# Patient Record
Sex: MALE | Race: WHITE | NOT HISPANIC OR LATINO | Employment: OTHER | URBAN - METROPOLITAN AREA
[De-identification: names, ages, dates, MRNs, and addresses within clinical notes are randomized per-mention and may not be internally consistent; named-entity substitution may affect disease eponyms.]

---

## 2017-05-18 ENCOUNTER — APPOINTMENT (OUTPATIENT)
Dept: NEUROLOGY | Facility: MEDICAL CENTER | Age: 72
End: 2017-05-18
Payer: MEDICARE

## 2019-04-29 ENCOUNTER — OFFICE VISIT (OUTPATIENT)
Dept: CARDIOLOGY | Facility: MEDICAL CENTER | Age: 74
End: 2019-04-29
Payer: MEDICARE

## 2019-04-29 ENCOUNTER — TELEPHONE (OUTPATIENT)
Dept: CARDIOLOGY | Facility: MEDICAL CENTER | Age: 74
End: 2019-04-29

## 2019-04-29 VITALS
HEIGHT: 75 IN | HEART RATE: 70 BPM | BODY MASS INDEX: 25.12 KG/M2 | OXYGEN SATURATION: 96 % | DIASTOLIC BLOOD PRESSURE: 70 MMHG | SYSTOLIC BLOOD PRESSURE: 120 MMHG | WEIGHT: 202 LBS

## 2019-04-29 DIAGNOSIS — I50.22 SYSTOLIC CHF, CHRONIC (HCC): ICD-10-CM

## 2019-04-29 DIAGNOSIS — I44.2 COMPLETE HEART BLOCK (HCC): ICD-10-CM

## 2019-04-29 DIAGNOSIS — I48.91 ATRIAL FIBRILLATION, UNSPECIFIED TYPE (HCC): ICD-10-CM

## 2019-04-29 DIAGNOSIS — Z95.0 CARDIAC PACEMAKER IN SITU: ICD-10-CM

## 2019-04-29 DIAGNOSIS — G20.A1 PARKINSON DISEASE: ICD-10-CM

## 2019-04-29 DIAGNOSIS — I10 BENIGN ESSENTIAL HTN: ICD-10-CM

## 2019-04-29 DIAGNOSIS — I48.91 ATRIAL FIBRILLATION, UNSPECIFIED TYPE (HCC): Primary | ICD-10-CM

## 2019-04-29 DIAGNOSIS — I47.10 SVT (SUPRAVENTRICULAR TACHYCARDIA): ICD-10-CM

## 2019-04-29 DIAGNOSIS — I25.10 CORONARY ARTERY DISEASE INVOLVING NATIVE CORONARY ARTERY OF NATIVE HEART WITHOUT ANGINA PECTORIS: ICD-10-CM

## 2019-04-29 LAB — EKG IMPRESSION: NORMAL

## 2019-04-29 PROCEDURE — 99205 OFFICE O/P NEW HI 60 MIN: CPT | Mod: 25 | Performed by: INTERNAL MEDICINE

## 2019-04-29 PROCEDURE — 93279 PRGRMG DEV EVAL PM/LDLS PM: CPT | Performed by: INTERNAL MEDICINE

## 2019-04-29 PROCEDURE — 93000 ELECTROCARDIOGRAM COMPLETE: CPT | Mod: 59 | Performed by: INTERNAL MEDICINE

## 2019-04-29 RX ORDER — PRAVASTATIN SODIUM 40 MG
TABLET ORAL
COMMUNITY
End: 2019-05-28

## 2019-04-29 RX ORDER — PRAVASTATIN SODIUM 10 MG
TABLET ORAL
COMMUNITY
End: 2019-05-28

## 2019-04-29 NOTE — PROGRESS NOTES
Arrhythmia Clinic Note (New patient)     DOS: 4/29/2019    Referring physician: Shaan Bryson    Chief complaint/Reason for consult: consideration for WATCHMAN    HPI:  Patient is a 74 yo M. 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 He has a history of systolic CHF 2/2 ICM, NYHA II symptoms that are stable. He has a history of CAD. S/p PCI. Last one was > 3 years ago. He has a history of SVT s/p prior ablation. Subsequently with PAF that is now persistent and largely asymptomatic. He also has Parkinsons with associated gait instability and at higher fall risk. They are understandably hesitant regarding long term oral anticoagulation. He was referred for consideration of DIDIER closure.    ROS (+ highlighted in red):  Constitutional: Fevers/chills/fatigue/weightloss  HEENT: Blurry vision/eye pain/sore throat/hearing loss  Respiratory: Shortness of breath/cough  Cardiovascular: Chest pain/palpitations/edema/orthopnea/syncope  GI: Nausea/vomitting/diarrhea  MSK: Arthralgias/myagias/muscle weakness  Skin: Rash/sores  Neurological: Numbness/tremors/vertigo  Endocrine: Excessive thirst/polyuria/cold intolerance/heat intolerance  Psych: Depression/anxiety    Past Medical History:   Diagnosis Date   • CAD (coronary artery disease) 10/11    s/p FRANCI to diagonal   • Chronic back pain    • ED (erectile dysfunction)    • GOUT     questionable   • History of atrial fibrillation     s/p ablation   • HTN (hypertension)     Borderline   • Hyperlipidemia    • Mitral regurgitation     moderate echo 1/13   • MAGDALENA (obstructive sleep apnea)     [facility]   • Pacemaker    • SVT (supraventricular tachycardia) ([facility])     s/p ablation       Past Surgical History:   Procedure Laterality Date   • RECOVERY  11/2/2011    Performed by SURGERY, CATH-RECOVERY at SURGERY SAME DAY [facility] ORS   • OTHER  2011    heart cath and stent   • KNEE ARTHROSCOPY  [id number]   • OTHER ORTHOPEDIC SURGERY      3 right knee  "surgies   • PACEMAKER INSERTION         Social History     Social History   • Marital status:      Spouse name: N/A   • Number of children: N/A   • Years of education: N/A     Occupational History   • Not on file.     Social History Main Topics   • Smoking status: Former Smoker     Packs/day: 1.00     Years: 10.00     Types: Cigarettes     Quit date: 11/2/1973   • Smokeless tobacco: Never Used   • Alcohol use Yes      Comment: social   • Drug use: No   • Sexual activity: Not on file     Other Topics Concern   • Not on file     Social History Narrative   • No narrative on file       Family History   Problem Relation Age of Onset   • Lung Disease Mother    • Stroke Father        No Known Allergies    Current Outpatient Prescriptions   Medication Sig Dispense Refill   • coenzyme Q-10 30 MG capsule Take 60 mg by mouth every day.     • citalopram (CELEXA) 40 MG Tab TAKE ONE TABLET BY MOUTH ONE TIME DAILY  90 Tab 0   • meloxicam (MOBIC) 15 MG tablet TAKE ONE TABLET BY MOUTH ONCE DAILY 90 Tab 3   • aspirin 81 MG tablet Take 81 mg by mouth every day.     • metoprolol SR (TOPROL XL) 50 MG TABLET SR 24 HR Take 50 mg by mouth every day.     • carbidopa-levodopa (SINEMET)  MG Tab Take 1 Tab by mouth 3 times a day. 270 Tab 1   • clopidogrel (PLAVIX) 75 MG Tab Take 75 mg by mouth every day.     • atorvastatin (LIPITOR) 40 MG TABS Take 1 Tab by mouth every day. 30 Tab 11   • pravastatin (PRAVACHOL) 10 MG Tab Take  by mouth.     • pravastatin (PRAVACHOL) 40 MG tablet Take  by mouth.     • aspirin EC (ECOTRIN) 81 MG Tablet Delayed Response Take  by mouth.     • aspirin EC (ECOTRIN) 81 MG Tablet Delayed Response Take  by mouth.       No current facility-administered medications for this visit.        Physical Exam:  Vitals:    04/29/19 0916   BP: 120/70   BP Location: Left arm   Patient Position: Sitting   BP Cuff Size: Adult   Pulse: 70   SpO2: 96%   Weight: 91.6 kg (202 lb)   Height: 1.905 m (6' 3\")     General " appearance: NAD, conversant   Eyes: anicteric sclerae, moist conjunctivae; no lid-lag; PERRLA  HENT: Atraumatic; oropharynx clear with moist mucous membranes and no mucosal ulcerations; normal hard and soft palate  Neck: Trachea midline; FROM, supple, no thyromegaly or lymphadenopathy  Lungs: CTA, with normal respiratory effort and no intercostal retractions  CV: RRR, no MRGs, no JVD   Abdomen: Soft, non-tender; no masses or HSM  Extremities: No peripheral edema or extremity lymphadenopathy  Skin: Normal temperature, turgor and texture; no rash, ulcers or subcutaneous nodules  Psych: Appropriate affect, alert and oriented to person, place and time    Data:  Labs reviewed    Prior echo/stress results reviewed:  LVEF 45%    EKG interpreted by me:  AF v-paced    Impression/Plan:  1. Atrial fibrillation, unspecified type (HCC)  EKG   2. Systolic CHF, chronic (HCC)     3. SVT (supraventricular tachycardia) ([facility])     4. Coronary artery disease involving native coronary artery of native heart without angina pectoris     5. Cardiac pacemaker in situ     6. Complete heart block (HCC)     7. Parkinson disease (HCC)       -Patient with CHADSVasc 5 and wary regarding long term OAC due to his Parkinsons and higher fall risk  -I think DIDIER closure is not unreasonable  -Risks/benefits/alternatives and details of the procedure were discussed  -All his questions were answered and they would like to proceed  -I will obtain images from his prior ALBARO at [facility]  -I will have him hold plavix prior to procedure and will keep him on ASA/warfarin regimen for 45 days post  -He is also chronically RV paced with mildly depressed LV function, I would consider CS lead upgrade for him at time of generator change, but I will defer this to his primary EP    Bharath Swenson MD

## 2019-05-28 ENCOUNTER — ANESTHESIA EVENT (OUTPATIENT)
Dept: SURGERY | Facility: MEDICAL CENTER | Age: 74
DRG: 274 | End: 2019-05-28
Payer: MEDICARE

## 2019-05-28 ENCOUNTER — APPOINTMENT (OUTPATIENT)
Dept: ADMISSIONS | Facility: MEDICAL CENTER | Age: 74
DRG: 274 | End: 2019-05-28
Attending: INTERNAL MEDICINE
Payer: MEDICARE

## 2019-05-28 RX ORDER — MELOXICAM 15 MG/1
15 TABLET ORAL PRN
Status: ON HOLD | COMMUNITY
End: 2019-05-30

## 2019-05-29 ENCOUNTER — HOSPITAL ENCOUNTER (INPATIENT)
Facility: MEDICAL CENTER | Age: 74
LOS: 1 days | DRG: 274 | End: 2019-05-30
Attending: INTERNAL MEDICINE | Admitting: INTERNAL MEDICINE
Payer: MEDICARE

## 2019-05-29 ENCOUNTER — APPOINTMENT (OUTPATIENT)
Dept: CARDIOLOGY | Facility: MEDICAL CENTER | Age: 74
DRG: 274 | End: 2019-05-29
Attending: INTERNAL MEDICINE
Payer: MEDICARE

## 2019-05-29 ENCOUNTER — ANESTHESIA (OUTPATIENT)
Dept: SURGERY | Facility: MEDICAL CENTER | Age: 74
DRG: 274 | End: 2019-05-29
Payer: MEDICARE

## 2019-05-29 ENCOUNTER — APPOINTMENT (OUTPATIENT)
Dept: RADIOLOGY | Facility: MEDICAL CENTER | Age: 74
DRG: 274 | End: 2019-05-29
Attending: INTERNAL MEDICINE
Payer: MEDICARE

## 2019-05-29 DIAGNOSIS — I48.91 ATRIAL FIBRILLATION, UNSPECIFIED TYPE (HCC): ICD-10-CM

## 2019-05-29 LAB
ABO + RH BLD: NORMAL
ABO GROUP BLD: NORMAL
ACT BLD: 257 SEC (ref 74–137)
ANION GAP SERPL CALC-SCNC: 8 MMOL/L (ref 0–11.9)
BASOPHILS # BLD AUTO: 0.8 % (ref 0–1.8)
BASOPHILS # BLD: 0.06 K/UL (ref 0–0.12)
BLD GP AB SCN SERPL QL: NORMAL
BUN SERPL-MCNC: 26 MG/DL (ref 8–22)
CALCIUM SERPL-MCNC: 9.4 MG/DL (ref 8.5–10.5)
CHLORIDE SERPL-SCNC: 105 MMOL/L (ref 96–112)
CO2 SERPL-SCNC: 26 MMOL/L (ref 20–33)
CREAT SERPL-MCNC: 1.31 MG/DL (ref 0.5–1.4)
EKG IMPRESSION: NORMAL
EOSINOPHIL # BLD AUTO: 0.12 K/UL (ref 0–0.51)
EOSINOPHIL NFR BLD: 1.6 % (ref 0–6.9)
ERYTHROCYTE [DISTWIDTH] IN BLOOD BY AUTOMATED COUNT: 42.4 FL (ref 35.9–50)
GLUCOSE SERPL-MCNC: 97 MG/DL (ref 65–99)
HCT VFR BLD AUTO: 48.7 % (ref 42–52)
HGB BLD-MCNC: 16 G/DL (ref 14–18)
IMM GRANULOCYTES # BLD AUTO: 0.04 K/UL (ref 0–0.11)
IMM GRANULOCYTES NFR BLD AUTO: 0.5 % (ref 0–0.9)
INR PPP: 1.07 (ref 0.87–1.13)
INR PPP: 1.22 (ref 0.87–1.13)
LYMPHOCYTES # BLD AUTO: 1.34 K/UL (ref 1–4.8)
LYMPHOCYTES NFR BLD: 17.4 % (ref 22–41)
MCH RBC QN AUTO: 31.2 PG (ref 27–33)
MCHC RBC AUTO-ENTMCNC: 32.9 G/DL (ref 33.7–35.3)
MCV RBC AUTO: 94.9 FL (ref 81.4–97.8)
MONOCYTES # BLD AUTO: 0.81 K/UL (ref 0–0.85)
MONOCYTES NFR BLD AUTO: 10.5 % (ref 0–13.4)
NEUTROPHILS # BLD AUTO: 5.31 K/UL (ref 1.82–7.42)
NEUTROPHILS NFR BLD: 69.2 % (ref 44–72)
NRBC # BLD AUTO: 0 K/UL
NRBC BLD-RTO: 0 /100 WBC
PLATELET # BLD AUTO: 216 K/UL (ref 164–446)
PMV BLD AUTO: 9.4 FL (ref 9–12.9)
POTASSIUM SERPL-SCNC: 4.4 MMOL/L (ref 3.6–5.5)
PROTHROMBIN TIME: 14 SEC (ref 12–14.6)
PROTHROMBIN TIME: 15.5 SEC (ref 12–14.6)
RBC # BLD AUTO: 5.13 M/UL (ref 4.7–6.1)
RH BLD: NORMAL
SODIUM SERPL-SCNC: 139 MMOL/L (ref 135–145)
WBC # BLD AUTO: 7.7 K/UL (ref 4.8–10.8)

## 2019-05-29 PROCEDURE — A9270 NON-COVERED ITEM OR SERVICE: HCPCS | Performed by: INTERNAL MEDICINE

## 2019-05-29 PROCEDURE — 85610 PROTHROMBIN TIME: CPT | Mod: 91

## 2019-05-29 PROCEDURE — 33340 PERQ CLSR TCAT L ATR APNDGE: CPT | Mod: Q0 | Performed by: INTERNAL MEDICINE

## 2019-05-29 PROCEDURE — 71046 X-RAY EXAM CHEST 2 VIEWS: CPT

## 2019-05-29 PROCEDURE — 700105 HCHG RX REV CODE 258: Performed by: ANESTHESIOLOGY

## 2019-05-29 PROCEDURE — 80048 BASIC METABOLIC PNL TOTAL CA: CPT

## 2019-05-29 PROCEDURE — 700111 HCHG RX REV CODE 636 W/ 250 OVERRIDE (IP)

## 2019-05-29 PROCEDURE — 86901 BLOOD TYPING SEROLOGIC RH(D): CPT

## 2019-05-29 PROCEDURE — 700102 HCHG RX REV CODE 250 W/ 637 OVERRIDE(OP): Performed by: INTERNAL MEDICINE

## 2019-05-29 PROCEDURE — 93010 ELECTROCARDIOGRAM REPORT: CPT | Performed by: INTERNAL MEDICINE

## 2019-05-29 PROCEDURE — 85025 COMPLETE CBC W/AUTO DIFF WBC: CPT

## 2019-05-29 PROCEDURE — 86900 BLOOD TYPING SEROLOGIC ABO: CPT

## 2019-05-29 PROCEDURE — 85347 COAGULATION TIME ACTIVATED: CPT

## 2019-05-29 PROCEDURE — B246ZZ4 ULTRASONOGRAPHY OF RIGHT AND LEFT HEART, TRANSESOPHAGEAL: ICD-10-PCS | Performed by: INTERNAL MEDICINE

## 2019-05-29 PROCEDURE — 700105 HCHG RX REV CODE 258: Performed by: INTERNAL MEDICINE

## 2019-05-29 PROCEDURE — 305387 CL-LEFT ATRIAL APPENDAGE CLOSURE

## 2019-05-29 PROCEDURE — 02L73DK OCCLUSION OF LEFT ATRIAL APPENDAGE WITH INTRALUMINAL DEVICE, PERCUTANEOUS APPROACH: ICD-10-PCS | Performed by: INTERNAL MEDICINE

## 2019-05-29 PROCEDURE — 93005 ELECTROCARDIOGRAM TRACING: CPT | Performed by: INTERNAL MEDICINE

## 2019-05-29 PROCEDURE — 160002 HCHG RECOVERY MINUTES (STAT)

## 2019-05-29 PROCEDURE — 770020 HCHG ROOM/CARE - TELE (206)

## 2019-05-29 PROCEDURE — 700101 HCHG RX REV CODE 250: Performed by: ANESTHESIOLOGY

## 2019-05-29 PROCEDURE — 700111 HCHG RX REV CODE 636 W/ 250 OVERRIDE (IP): Performed by: ANESTHESIOLOGY

## 2019-05-29 PROCEDURE — 93325 DOPPLER ECHO COLOR FLOW MAPG: CPT

## 2019-05-29 PROCEDURE — 86850 RBC ANTIBODY SCREEN: CPT

## 2019-05-29 RX ORDER — METOPROLOL SUCCINATE 50 MG/1
50 TABLET, EXTENDED RELEASE ORAL
Status: DISCONTINUED | OUTPATIENT
Start: 2019-05-29 | End: 2019-05-30 | Stop reason: HOSPADM

## 2019-05-29 RX ORDER — OXYCODONE HCL 5 MG/5 ML
5 SOLUTION, ORAL ORAL
Status: DISCONTINUED | OUTPATIENT
Start: 2019-05-29 | End: 2019-05-29 | Stop reason: HOSPADM

## 2019-05-29 RX ORDER — CITALOPRAM 40 MG/1
40 TABLET ORAL DAILY
Status: DISCONTINUED | OUTPATIENT
Start: 2019-05-30 | End: 2019-05-30 | Stop reason: HOSPADM

## 2019-05-29 RX ORDER — WARFARIN SODIUM 5 MG/1
5 TABLET ORAL
Status: DISCONTINUED | OUTPATIENT
Start: 2019-05-29 | End: 2019-05-30

## 2019-05-29 RX ORDER — OXYCODONE HCL 5 MG/5 ML
10 SOLUTION, ORAL ORAL
Status: DISCONTINUED | OUTPATIENT
Start: 2019-05-29 | End: 2019-05-29 | Stop reason: HOSPADM

## 2019-05-29 RX ORDER — PROTAMINE SULFATE 10 MG/ML
INJECTION, SOLUTION INTRAVENOUS
Status: COMPLETED
Start: 2019-05-29 | End: 2019-05-29

## 2019-05-29 RX ORDER — CEFAZOLIN SODIUM 1 G/3ML
INJECTION, POWDER, FOR SOLUTION INTRAMUSCULAR; INTRAVENOUS PRN
Status: DISCONTINUED | OUTPATIENT
Start: 2019-05-29 | End: 2019-05-29 | Stop reason: SURG

## 2019-05-29 RX ORDER — SODIUM CHLORIDE, SODIUM LACTATE, POTASSIUM CHLORIDE, CALCIUM CHLORIDE 600; 310; 30; 20 MG/100ML; MG/100ML; MG/100ML; MG/100ML
INJECTION, SOLUTION INTRAVENOUS CONTINUOUS
Status: DISCONTINUED | OUTPATIENT
Start: 2019-05-29 | End: 2019-05-29

## 2019-05-29 RX ORDER — ATORVASTATIN CALCIUM 40 MG/1
40 TABLET, FILM COATED ORAL
Status: DISCONTINUED | OUTPATIENT
Start: 2019-05-29 | End: 2019-05-30 | Stop reason: HOSPADM

## 2019-05-29 RX ORDER — HEPARIN SODIUM,PORCINE 1000/ML
VIAL (ML) INJECTION
Status: COMPLETED
Start: 2019-05-29 | End: 2019-05-29

## 2019-05-29 RX ORDER — SODIUM CHLORIDE, SODIUM LACTATE, POTASSIUM CHLORIDE, CALCIUM CHLORIDE 600; 310; 30; 20 MG/100ML; MG/100ML; MG/100ML; MG/100ML
INJECTION, SOLUTION INTRAVENOUS
Status: DISCONTINUED | OUTPATIENT
Start: 2019-05-29 | End: 2019-05-29 | Stop reason: SURG

## 2019-05-29 RX ORDER — ONDANSETRON 2 MG/ML
4 INJECTION INTRAMUSCULAR; INTRAVENOUS
Status: DISCONTINUED | OUTPATIENT
Start: 2019-05-29 | End: 2019-05-29 | Stop reason: HOSPADM

## 2019-05-29 RX ORDER — ONDANSETRON 2 MG/ML
INJECTION INTRAMUSCULAR; INTRAVENOUS PRN
Status: DISCONTINUED | OUTPATIENT
Start: 2019-05-29 | End: 2019-05-29 | Stop reason: SURG

## 2019-05-29 RX ORDER — SODIUM CHLORIDE 9 MG/ML
INJECTION, SOLUTION INTRAVENOUS CONTINUOUS
Status: DISCONTINUED | OUTPATIENT
Start: 2019-05-29 | End: 2019-05-30 | Stop reason: HOSPADM

## 2019-05-29 RX ORDER — SODIUM CHLORIDE 9 MG/ML
INJECTION, SOLUTION INTRAVENOUS CONTINUOUS
Status: DISCONTINUED | OUTPATIENT
Start: 2019-05-29 | End: 2019-05-29

## 2019-05-29 RX ORDER — LIDOCAINE HYDROCHLORIDE 40 MG/ML
SOLUTION TOPICAL
Status: DISPENSED
Start: 2019-05-29 | End: 2019-05-30

## 2019-05-29 RX ORDER — SODIUM CHLORIDE, SODIUM LACTATE, POTASSIUM CHLORIDE, CALCIUM CHLORIDE 600; 310; 30; 20 MG/100ML; MG/100ML; MG/100ML; MG/100ML
INJECTION, SOLUTION INTRAVENOUS CONTINUOUS
Status: ACTIVE | OUTPATIENT
Start: 2019-05-29 | End: 2019-05-30

## 2019-05-29 RX ADMIN — SODIUM CHLORIDE, POTASSIUM CHLORIDE, SODIUM LACTATE AND CALCIUM CHLORIDE: 600; 310; 30; 20 INJECTION, SOLUTION INTRAVENOUS at 13:37

## 2019-05-29 RX ADMIN — CEFAZOLIN 2 G: 330 INJECTION, POWDER, FOR SOLUTION INTRAMUSCULAR; INTRAVENOUS at 13:51

## 2019-05-29 RX ADMIN — Medication 60 MG: at 22:23

## 2019-05-29 RX ADMIN — SODIUM CHLORIDE: 9 INJECTION, SOLUTION INTRAVENOUS at 19:28

## 2019-05-29 RX ADMIN — CARBIDOPA AND LEVODOPA 1 TABLET: 25; 100 TABLET ORAL at 21:41

## 2019-05-29 RX ADMIN — PROTAMINE SULFATE 40 MG: 10 INJECTION, SOLUTION INTRAVENOUS at 14:41

## 2019-05-29 RX ADMIN — ROCURONIUM BROMIDE 50 MG: 10 INJECTION, SOLUTION INTRAVENOUS at 13:44

## 2019-05-29 RX ADMIN — HEPARIN SODIUM 4000 UNITS: 1000 INJECTION, SOLUTION INTRAVENOUS; SUBCUTANEOUS at 14:00

## 2019-05-29 RX ADMIN — METOPROLOL SUCCINATE 50 MG: 50 TABLET, EXTENDED RELEASE ORAL at 19:31

## 2019-05-29 RX ADMIN — ATORVASTATIN CALCIUM 40 MG: 40 TABLET, FILM COATED ORAL at 19:29

## 2019-05-29 RX ADMIN — ONDANSETRON 4 MG: 2 INJECTION INTRAMUSCULAR; INTRAVENOUS at 14:34

## 2019-05-29 RX ADMIN — SODIUM CHLORIDE, POTASSIUM CHLORIDE, SODIUM LACTATE AND CALCIUM CHLORIDE: 600; 310; 30; 20 INJECTION, SOLUTION INTRAVENOUS at 12:14

## 2019-05-29 RX ADMIN — HEPARIN SODIUM: 1000 INJECTION, SOLUTION INTRAVENOUS; SUBCUTANEOUS at 14:01

## 2019-05-29 RX ADMIN — WARFARIN SODIUM 5 MG: 5 TABLET ORAL at 21:41

## 2019-05-29 RX ADMIN — PROPOFOL 200 MG: 10 INJECTION, EMULSION INTRAVENOUS at 13:44

## 2019-05-29 RX ADMIN — EPHEDRINE SULFATE 10 MG: 50 INJECTION INTRAMUSCULAR; INTRAVENOUS; SUBCUTANEOUS at 14:15

## 2019-05-29 ASSESSMENT — PATIENT HEALTH QUESTIONNAIRE - PHQ9
SUM OF ALL RESPONSES TO PHQ9 QUESTIONS 1 AND 2: 0
2. FEELING DOWN, DEPRESSED, IRRITABLE, OR HOPELESS: NOT AT ALL
SUM OF ALL RESPONSES TO PHQ9 QUESTIONS 1 AND 2: 0
1. LITTLE INTEREST OR PLEASURE IN DOING THINGS: NOT AT ALL
SUM OF ALL RESPONSES TO PHQ9 QUESTIONS 1 AND 2: 0
1. LITTLE INTEREST OR PLEASURE IN DOING THINGS: NOT AT ALL
1. LITTLE INTEREST OR PLEASURE IN DOING THINGS: NOT AT ALL
2. FEELING DOWN, DEPRESSED, IRRITABLE, OR HOPELESS: NOT AT ALL

## 2019-05-29 ASSESSMENT — COPD QUESTIONNAIRES
IN THE PAST 12 MONTHS DO YOU DO LESS THAN YOU USED TO BECAUSE OF YOUR BREATHING PROBLEMS: DISAGREE/UNSURE
DO YOU EVER COUGH UP ANY MUCUS OR PHLEGM?: NO/ONLY WITH OCCASIONAL COLDS OR INFECTIONS
HAVE YOU SMOKED AT LEAST 100 CIGARETTES IN YOUR ENTIRE LIFE: YES
COPD SCREENING SCORE: 4
DURING THE PAST 4 WEEKS HOW MUCH DID YOU FEEL SHORT OF BREATH: NONE/LITTLE OF THE TIME

## 2019-05-29 ASSESSMENT — COGNITIVE AND FUNCTIONAL STATUS - GENERAL
SUGGESTED CMS G CODE MODIFIER MOBILITY: CH
MOBILITY SCORE: 24

## 2019-05-29 ASSESSMENT — CHA2DS2 SCORE
CHA2DS2 VASC SCORE: 4
VASCULAR DISEASE: YES
DIABETES: NO
AGE 75 OR GREATER: YES
PRIOR STROKE OR TIA OR THROMBOEMBOLISM: NO
AGE 65 TO 74: NO
SEX: MALE
CHF OR LEFT VENTRICULAR DYSFUNCTION: YES
HYPERTENSION: NO

## 2019-05-29 ASSESSMENT — LIFESTYLE VARIABLES: ALCOHOL_USE: NO

## 2019-05-29 ASSESSMENT — PAIN SCALES - GENERAL: PAIN_LEVEL: 0

## 2019-05-29 NOTE — ANESTHESIA PREPROCEDURE EVALUATION
Relevant Problems   (+) Aortic regurgitation   (+) CAD (coronary artery disease)   (+) Mitral regurgitation   (+) Parkinson disease (HCC)     Vitals:    05/29/19 1212   BP: 133/96   Pulse: 79   Resp: 18   Temp: 36.5 °C (97.7 °F)   SpO2: 97%      Physical Exam    Airway   Mallampati: II  TM distance: >3 FB  Neck ROM: full       Cardiovascular - normal exam  Rhythm: regular  Rate: normal  (-) murmur     Dental - normal exam         Pulmonary - normal exam  Breath sounds clear to auscultation     Abdominal    Neurological - normal exam                 Anesthesia Plan    ASA 3 (afib, hx svt)       Plan - general       Airway plan will be ETT        Induction: intravenous    Postoperative Plan: Postoperative administration of opioids is intended.    Pertinent diagnostic labs and testing reviewed    Informed Consent:    Anesthetic plan and risks discussed with patient.    Use of blood products discussed with: patient whom consented to blood products.

## 2019-05-29 NOTE — OR NURSING
1503 patient arrived from cath lab s/p watchman DIDIER closure. Patient awake. Denies pain, denies n/v, right groin access site, gauze tegaderm for dressing clean dry intact and soft. Vital signs stable. Arterial line right radial.  1540 Arterial line removed, gauze and tegaderm applied no bleeding, no hematoma.   1600 patient given water tolerating well.    1715 Criteria met to discharge patient out of ppu.  1725 Report given to Catie GRESHAM T838 all questions answered.  1735 patient transported to room with all his personal belongings.  1740 checked surgical sites with MARGA Haddad no bleeding no hematoma. Patient not in any distress, tele monitor on. Care transferred to Catie GRESHAM

## 2019-05-29 NOTE — OP REPORT
"[facility] Electrophysiology/Structural Heart Procedure Note     Procedure(s) Performed:   1) Watchman DIDIER closure    Indication(s):  Paroxysmal atrial fibrillation  CAD  Chronic systolic CHF    Physician(s): Bharath Swenson M.D.     Resident/Assistant(s): None     Anesthesia: General endotracheal anesthetic with Dr. Noah Bacon     Specimen(s) Removed: None     Estimated Blood Loss:  30cc     Complications:  None     Description of Procedure:   After informed written consent, the patient was brought to the cath lab in the fasting, non-sedated state. The patient was prepped and draped in the usual sterile fashion. Femoral venous access was obtained using the modified Seldinger technique. This was done under direct US guidance to visualize the needle insertion. Images were saved to the PACS system. In the right femoral vein, an 8 Fr sheath were inserted over 0.035” guidewire and exchanged for 16 Fr outer sheath. An 8.5 Fr [place] trans-septal sheath was used for trans-septal access. ALBARO was used to identify the atrial septum, and left atrial appendage.  A [place] trans-septal needle was inserted to into the trans-septal sheath, and under ultrasound guidance a inferior/posterior trans-septal location was used to cross into the left atrium. Intravenous heparin was given to target an -300. A stiff exchange length 0.035\" wire was inserted into the left atrium/left pulmonary veins, over which we exchanged to the WATCHMAN delivery sheath. The WATCHMAN device was prepped and flushed. A pigtail catheter was advanced into the delivery sheath into the left atrium and then after counter clockwise torque maneuvered into the body of the left atrial appendage. Cine was taken to verify the location of the pigtail in the appendage and to assess for depth. The sheath was then advanced over the pigtail until sufficient depth was reached.  The pigtail was removed, and under wet to wet connection the WATCHMAN device was advanced " through the delivery sheath until markers were aligned. The sheath was retracted slowly to deploy the device. The initial deployment the device was not seated correctly and tilted posteriorly. We performed a full recapture. We re-advanced the pigtail in the appendage and maneuvered the WATCHMAN sheath back in the appendage. A second device was re-prepped and again device was advanced through delivery sheath until markers aligned. Sheath was retracted and device deployed again. After the device was deployed we assessed again for leak with contrast injected through the sheath as well as a tug test. We verified good position, anchoring, size, and seal with no/minimal leak with ALBARO. After all criteria were met we detached the device from the delivery system. At the end of the procedure, heparin was reversed with protamine, the catheter and sheaths were removed, and hemostasis was achieved by manual compression along with a figure of 8 silk suture. Following recovery from anesthesia, the patient was transferred to the PPU in good condition.        Fluoroscopy time:  8.1 minutes    Contrast used: 74 cc     Device implanted:  Size  27 mm  Serial #[id number]  Max appendage pre-measurement 23mm  Max device measurement 22 mm (19%) compression     Impressions:    1. Successful DIDIER closure      Recommendations:  1. Warfarin and ASA 81 (target INR 2-2.5)   2. Admit to monitored bedrest.  3. Echo and removal of figure of 8 suture in the AM

## 2019-05-29 NOTE — H&P
EP Pre-procedure History and Physical    DOS: 5/29/2019    Chief complaint/Reason for consult: AF    Interval History:  Patient is a 72 yo M. History of AF and Parkinson's disease with high fall risk. Desire to get off OAC. Referred for DIDIER closure. Here for procedure today.    ROS (+ highlighted in red):  Constitutional: Fevers/chills/fatigue/weightloss  HEENT: Blurry vision/eye pain/sore throat/hearing loss  Respiratory: Shortness of breath/cough  Cardiovascular: Chest pain/palpitations/edema/orthopnea/syncope  GI: Nausea/vomitting/diarrhea  MSK: Arthralgias/myagias/muscle weakness  Skin: Rash/sores  Neurological: Numbness/tremors/vertigo  Endocrine: Excessive thirst/polyuria/cold intolerance/heat intolerance  Psych: Depression/anxiety    Past Medical History:   Diagnosis Date   • Anxiety    • Arthritis     osteo, right knee   • Breath shortness     with exertion   • CAD (coronary artery disease) 10/11    s/p FRANCI to diagonal   • Cancer (HCC)     skin   • Cataract     removed bilat   • Chronic cough    • Depression    • ED (erectile dysfunction)    • GOUT     questionable   • Heart burn    • High cholesterol    • History of atrial fibrillation     s/p ablation   • History of claustrophobia    • HTN (hypertension)     Borderline   • Hyperlipidemia    • Indigestion    • Mitral regurgitation     moderate echo 1/13   • MAGDALENA (obstructive sleep apnea)     doesn't uses CPAP   • Pacemaker    • Parkinson disease (HCC)    • Snoring    • SVT (supraventricular tachycardia) ([facility])     s/p ablation   • Urinary urgency        Past Surgical History:   Procedure Laterality Date   • RECOVERY  11/2/2011    Performed by SURGERY, CATH-RECOVERY at SURGERY SAME DAY [facility] ORS   • OTHER  2011    heart cath and stent   • CATARACT PHACO WITH IOL Bilateral    • KNEE ARTHROSCOPY  [id number]   • OTHER ORTHOPEDIC SURGERY      3 right knee surgies   • PACEMAKER INSERTION         Social History     Social History   • Marital status:       "Spouse name: N/A   • Number of children: N/A   • Years of education: N/A     Occupational History   • Not on file.     Social History Main Topics   • Smoking status: Former Smoker     Packs/day: 1.00     Years: 10.00     Types: Cigarettes     Quit date: 11/2/1973   • Smokeless tobacco: Never Used   • Alcohol use Yes      Comment: 1-2 daily   • Drug use: No   • Sexual activity: Not on file     Other Topics Concern   • Not on file     Social History Narrative   • No narrative on file       Family History   Problem Relation Age of Onset   • Lung Disease Mother    • Stroke Father        No Known Allergies    Current Facility-Administered Medications   Medication Dose Route Frequency Provider Last Rate Last Dose   • lidocaine (XYLOCAINE) 1 % injection 0.5 mL  0.5 mL Intradermal Once PRN Bharath Swenson M.D.       • lactated ringers infusion   Intravenous Continuous Shining NEGRO Swenson 10 mL/hr at 05/29/19 1214         Physical Exam:  Vitals:    05/29/19 1134 05/29/19 1212   BP:  133/96   Pulse:  79   Resp:  18   Temp:  36.5 °C (97.7 °F)   TempSrc:  Temporal   SpO2:  97%   Weight: 91.6 kg (202 lb)    Height: 1.905 m (6' 3\")      General appearance: NAD, conversant   Eyes: anicteric sclerae, moist conjunctivae; no lid-lag; PERRLA  HENT: Atraumatic; oropharynx clear with moist mucous membranes and no mucosal ulcerations; normal hard and soft palate  Neck: Trachea midline; FROM, supple, no thyromegaly or lymphadenopathy  Lungs: CTA, with normal respiratory effort and no intercostal retractions  CV: irregularly irregular, no MRGs, no JVD  Abdomen: Soft, non-tender; no masses or HSM  Extremities: No peripheral edema or extremity lymphadenopathy  Skin: Normal temperature, turgor and texture; no rash, ulcers or subcutaneous nodules  Psych: Appropriate affect, alert and oriented to person, place and time    Data:  Labs reviewed    EKG interpreted by me:  AF    Impression/Plan:  1)AF  2)Oral anticoagulation  3)Parkinson's " disease    -Risks/benefits/alternatives discussed  -All questions answered  -Proceed with ALBARO/WATCHMAN    Bharath Swenson MD

## 2019-05-29 NOTE — ANESTHESIA PROCEDURE NOTES
Arterial Line  Performed by: SRINIVAS FELIPE  Authorized by: SRINIVAS FELIPE     Start Time:  5/29/2019 1:49 PM  Localization: surface landmarks    Patient Location:  OR  Indication: continuous blood pressure monitoring    Catheter Size:  20 G  Seldinger Technique?: Yes    Laterality:  Right  Site:  Radial artery  Line Secured:  Antimicrobial disc, tape and transparent dressing  Events: patient tolerated procedure well with no complications

## 2019-05-29 NOTE — ANESTHESIA TIME REPORT
Anesthesia Start and Stop Event Times     Date Time Event    5/29/2019 1337 Anesthesia Start     1448 Anesthesia Stop        Responsible Staff  05/29/19    Name Role Begin End    Noah Bacon M.D. Anesth 1337 1448        Preop Diagnosis (Free Text):  Pre-op Diagnosis             Preop Diagnosis (Codes):  Diagnosis Information     Diagnosis Code(s):         Post op Diagnosis  A-fib (HCC)      Premium Reason  Non-Premium    Comments: lion [id number], art line

## 2019-05-29 NOTE — ANESTHESIA POSTPROCEDURE EVALUATION
Patient: Roger Jimenez    Procedure Summary     Date:  05/29/19 Room / Location:  PPU 06 / SURGERY PRE-POST PROC UNIT [facility] [place] - CATH LAB [facility]    Anesthesia Start:  1337 Anesthesia Stop:  1448    Procedures:       CL-LEFT ATRIAL APPENDAGE CLOSURE/Watchman ALBARO with Anesthesia/Kemal/Dr. Swenson      CL-LEFT ATRIAL APPENDAGE CLOSURE Diagnosis:  Atrial fibrillation, unspecified type (HCC)    Scheduled Providers:  Bharath Swenson M.D.; [facility] Surgery Responsible Provider:  Noah Bacon M.D.    Anesthesia Type:  general ASA Status:  3          Final Anesthesia Type: general  Last vitals  BP   Blood Pressure : 133/96    Temp   36.5 °C (97.7 °F)    Pulse   Pulse: 79   Resp   18    SpO2   97 %      Anesthesia Post Evaluation    Patient location during evaluation: PACU  Patient participation: complete - patient participated  Level of consciousness: awake and alert  Pain score: 0    Airway patency: patent  Anesthetic complications: no  Cardiovascular status: hemodynamically stable  Respiratory status: acceptable  Hydration status: euvolemic    PONV: none

## 2019-05-29 NOTE — ANESTHESIA PROCEDURE NOTES
Airway  Date/Time: 5/29/2019 1:46 PM  Performed by: SRINIVAS FELIPE  Authorized by: SRINIVAS FELIPE     Location:  OR  Urgency:  Elective  Difficult Airway: No    Indications for Airway Management:  Anesthesia  Spontaneous Ventilation: absent    Sedation Level:  Deep  Preoxygenated: Yes    Patient Position:  Sniffing  Mask Difficulty Assessment:  2 - vent by mask + OA or adjuvant +/- NMBA  Final Airway Type:  Endotracheal airway  Final Endotracheal Airway:  ETT  Cuffed: Yes    Technique Used for Successful ETT Placement:  Direct laryngoscopy  Insertion Site:  Oral  Blade Type:  Daniel  Laryngoscope Blade/Videolaryngoscope Blade Size:  4  ETT Size (mm):  7.0  Measured from:  Teeth  ETT to Teeth (cm):  23  Placement Verified by: auscultation and capnometry    Cormack-Lehane Classification:  Grade I - full view of glottis  Number of Attempts at Approach:  1

## 2019-05-30 ENCOUNTER — APPOINTMENT (OUTPATIENT)
Dept: CARDIOLOGY | Facility: MEDICAL CENTER | Age: 74
DRG: 274 | End: 2019-05-30
Attending: INTERNAL MEDICINE
Payer: MEDICARE

## 2019-05-30 VITALS
SYSTOLIC BLOOD PRESSURE: 117 MMHG | DIASTOLIC BLOOD PRESSURE: 75 MMHG | HEART RATE: 70 BPM | WEIGHT: 203.93 LBS | TEMPERATURE: 96.7 F | HEIGHT: 75 IN | RESPIRATION RATE: 16 BRPM | BODY MASS INDEX: 25.36 KG/M2 | OXYGEN SATURATION: 94 %

## 2019-05-30 LAB
ANION GAP SERPL CALC-SCNC: 6 MMOL/L (ref 0–11.9)
BUN SERPL-MCNC: 28 MG/DL (ref 8–22)
CALCIUM SERPL-MCNC: 8.2 MG/DL (ref 8.5–10.5)
CHLORIDE SERPL-SCNC: 107 MMOL/L (ref 96–112)
CO2 SERPL-SCNC: 27 MMOL/L (ref 20–33)
CREAT SERPL-MCNC: 1.14 MG/DL (ref 0.5–1.4)
ERYTHROCYTE [DISTWIDTH] IN BLOOD BY AUTOMATED COUNT: 43.8 FL (ref 35.9–50)
GLUCOSE SERPL-MCNC: 100 MG/DL (ref 65–99)
HCT VFR BLD AUTO: 40.3 % (ref 42–52)
HGB BLD-MCNC: 12.9 G/DL (ref 14–18)
INR PPP: 1.18 (ref 0.87–1.13)
LV EJECT FRACT  99904: 55
MCH RBC QN AUTO: 31.1 PG (ref 27–33)
MCHC RBC AUTO-ENTMCNC: 32 G/DL (ref 33.7–35.3)
MCV RBC AUTO: 97.1 FL (ref 81.4–97.8)
PLATELET # BLD AUTO: 162 K/UL (ref 164–446)
PMV BLD AUTO: 9.3 FL (ref 9–12.9)
POTASSIUM SERPL-SCNC: 4.2 MMOL/L (ref 3.6–5.5)
PROTHROMBIN TIME: 15.1 SEC (ref 12–14.6)
RBC # BLD AUTO: 4.15 M/UL (ref 4.7–6.1)
SODIUM SERPL-SCNC: 140 MMOL/L (ref 135–145)
WBC # BLD AUTO: 8.8 K/UL (ref 4.8–10.8)

## 2019-05-30 PROCEDURE — 93308 TTE F-UP OR LMTD: CPT | Mod: 26 | Performed by: INTERNAL MEDICINE

## 2019-05-30 PROCEDURE — 93308 TTE F-UP OR LMTD: CPT

## 2019-05-30 PROCEDURE — 700102 HCHG RX REV CODE 250 W/ 637 OVERRIDE(OP): Performed by: INTERNAL MEDICINE

## 2019-05-30 PROCEDURE — 85610 PROTHROMBIN TIME: CPT

## 2019-05-30 PROCEDURE — A9270 NON-COVERED ITEM OR SERVICE: HCPCS | Performed by: INTERNAL MEDICINE

## 2019-05-30 PROCEDURE — 700105 HCHG RX REV CODE 258: Performed by: INTERNAL MEDICINE

## 2019-05-30 PROCEDURE — 80048 BASIC METABOLIC PNL TOTAL CA: CPT

## 2019-05-30 PROCEDURE — 36415 COLL VENOUS BLD VENIPUNCTURE: CPT

## 2019-05-30 PROCEDURE — 85027 COMPLETE CBC AUTOMATED: CPT

## 2019-05-30 RX ORDER — WARFARIN SODIUM 5 MG/1
5 TABLET ORAL
Status: DISCONTINUED | OUTPATIENT
Start: 2019-05-31 | End: 2019-05-30 | Stop reason: HOSPADM

## 2019-05-30 RX ORDER — WARFARIN SODIUM 5 MG/1
5 TABLET ORAL
Qty: 30 TAB | Refills: 0 | Status: SHIPPED | OUTPATIENT
Start: 2019-05-30 | End: 2019-08-02

## 2019-05-30 RX ORDER — WARFARIN SODIUM 7.5 MG/1
7.5 TABLET ORAL
Status: DISCONTINUED | OUTPATIENT
Start: 2019-05-30 | End: 2019-05-30 | Stop reason: HOSPADM

## 2019-05-30 RX ADMIN — CARBIDOPA AND LEVODOPA 1 TABLET: 25; 100 TABLET ORAL at 05:01

## 2019-05-30 RX ADMIN — ASPIRIN 81 MG: 81 TABLET ORAL at 05:01

## 2019-05-30 RX ADMIN — SODIUM CHLORIDE: 9 INJECTION, SOLUTION INTRAVENOUS at 04:59

## 2019-05-30 RX ADMIN — CITALOPRAM HYDROBROMIDE 40 MG: 40 TABLET ORAL at 05:01

## 2019-05-30 ASSESSMENT — COGNITIVE AND FUNCTIONAL STATUS - GENERAL
MOBILITY SCORE: 21
CLIMB 3 TO 5 STEPS WITH RAILING: A LITTLE
SUGGESTED CMS G CODE MODIFIER DAILY ACTIVITY: CH
WALKING IN HOSPITAL ROOM: A LITTLE
DAILY ACTIVITIY SCORE: 24
SUGGESTED CMS G CODE MODIFIER MOBILITY: CJ
STANDING UP FROM CHAIR USING ARMS: A LITTLE

## 2019-05-30 ASSESSMENT — PATIENT HEALTH QUESTIONNAIRE - PHQ9
SUM OF ALL RESPONSES TO PHQ9 QUESTIONS 1 AND 2: 0
1. LITTLE INTEREST OR PLEASURE IN DOING THINGS: NOT AT ALL
2. FEELING DOWN, DEPRESSED, IRRITABLE, OR HOPELESS: NOT AT ALL

## 2019-05-30 NOTE — DISCHARGE INSTRUCTIONS
Discharge Instructions    Discharged to home by car with relative. Discharged via wheelchair, hospital escort: Yes.  Special equipment needed: Not Applicable    Be sure to schedule a follow-up appointment with your primary care doctor or any specialists as instructed.     Discharge Plan:   Diet Plan: Discussed  Activity Level: Discussed  Confirmed Follow up Appointment: Appointment Scheduled  Confirmed Symptoms Management: Discussed  Medication Reconciliation Updated: Yes  Pneumococcal Vaccine Administered/Refused: Not given - Patient refused pneumococcal vaccine  Influenza Vaccine Indication: Patient Refuses    I understand that a diet low in cholesterol, fat, and sodium is recommended for good health. Unless I have been given specific instructions below for another diet, I accept this instruction as my diet prescription.   Other diet: cardiac    Special Instructions:   No lifting > 10 lbs x 1 week.  No baths or hot tubs x 1 week.  May shower on Friday and take off groin dressing.  Continue to monitor site daily for warmth, redness, discolored drainage     Please walk and take deep breaths after discharge.  After discharge, if  experiences chest pain, shortness of breath,  neurological changes, high fever, pre syncope/syncope, trouble with catheter site needs to be seen in the emergency dept.             · Is patient discharged on Warfarin / Coumadin?   Yes    You are receiving the drug warfarin. Please understand the importance of monitoring warfarin with scheduled PT/INR blood draws.  Follow-up with the [facility] tomorrow for INR lab..    IMPORTANT: HOW TO USE THIS INFORMATION:  This is a summary and does NOT have all possible information about this product. This information does not assure that this product is safe, effective, or appropriate for you. This information is not individual medical advice and does not substitute for the advice of your health care professional. Always ask your health care  "professional for complete information about this product and your specific health needs.      WARFARIN - ORAL (WARF-uh-rin)      COMMON BRAND NAME(S): Coumadin      WARNING:  Warfarin can cause very serious (possibly fatal) bleeding. This is more likely to occur when you first start taking this medication or if you take too much warfarin. To decrease your risk for bleeding, your doctor or other health care provider will monitor you closely and check your lab results (INR test) to make sure you are not taking too much warfarin. Keep all medical and laboratory appointments. Tell your doctor right away if you notice any signs of serious bleeding. See also Side Effects section.      USES:  This medication is used to treat blood clots (such as in deep vein thrombosis-DVT or pulmonary embolus-PE) and/or to prevent new clots from forming in your body. Preventing harmful blood clots helps to reduce the risk of a stroke or heart attack. Conditions that increase your risk of developing blood clots include a certain type of irregular heart rhythm (atrial fibrillation), heart valve replacement, recent heart attack, and certain surgeries (such as hip/knee replacement). Warfarin is commonly called a \"blood thinner,\" but the more correct term is \"anticoagulant.\" It helps to keep blood flowing smoothly in your body by decreasing the amount of certain substances (clotting proteins) in your blood.      HOW TO USE:  Read the Medication Guide provided by your pharmacist before you start taking warfarin and each time you get a refill. If you have any questions, ask your doctor or pharmacist. Take this medication by mouth with or without food as directed by your doctor or other health care professional, usually once a day. It is very important to take it exactly as directed. Do not increase the dose, take it more frequently, or stop using it unless directed by your doctor. Dosage is based on your medical condition, laboratory tests (such " as INR), and response to treatment. Your doctor or other health care provider will monitor you closely while you are taking this medication to determine the right dose for you. Use this medication regularly to get the most benefit from it. To help you remember, take it at the same time each day. It is important to eat a balanced, consistent diet while taking warfarin. Some foods can affect how warfarin works in your body and may affect your treatment and dose. Avoid sudden large increases or decreases in your intake of foods high in vitamin K (such as broccoli, cauliflower, cabbage, brussels sprouts, kale, spinach, and other green leafy vegetables, liver, green tea, certain vitamin supplements). If you are trying to lose weight, check with your doctor before you try to go on a diet. Cranberry products may also affect how your warfarin works. Limit the amount of cranberry juice (16 ounces/480 milliliters a day) or other cranberry products you may drink or eat.      SIDE EFFECTS:  Nausea, loss of appetite, or stomach/abdominal pain may occur. If any of these effects persist or worsen, tell your doctor or pharmacist promptly. Remember that your doctor has prescribed this medication because he or she has judged that the benefit to you is greater than the risk of side effects. Many people using this medication do not have serious side effects. This medication can cause serious bleeding if it affects your blood clotting proteins too much (shown by unusually high INR lab results). Even if your doctor stops your medication, this risk of bleeding can continue for up to a week. Tell your doctor right away if you have any signs of serious bleeding, including: unusual pain/swelling/discomfort, unusual/easy bruising, prolonged bleeding from cuts or gums, persistent/frequent nosebleeds, unusually heavy/prolonged menstrual flow, pink/dark urine, coughing up blood, vomit that is bloody or looks like coffee grounds, severe headache,  dizziness/fainting, unusual or persistent tiredness/weakness, bloody/black/tarry stools, chest pain, shortness of breath, difficulty swallowing. Tell your doctor right away if any of these unlikely but serious side effects occur: persistent nausea/vomiting, severe stomach/abdominal pain, yellowing eyes/skin. This drug rarely has caused very serious (possibly fatal) problems if its effects lead to small blood clots (usually at the beginning of treatment). This can lead to severe skin/tissue damage that may require surgery or amputation if left untreated. Patients with certain blood conditions (protein C or S deficiency) may be at greater risk. Get medical help right away if any of these rare but serious side effects occur: painful/red/purplish patches on the skin (such as on the toe, breast, abdomen), change in the amount of urine, vision changes, confusion, slurred speech, weakness on one side of the body. A very serious allergic reaction to this drug is rare. However, get medical help right away if you notice any symptoms of a serious allergic reaction, including: rash, itching/swelling (especially of the face/tongue/throat), severe dizziness, trouble breathing. This is not a complete list of possible side effects. If you notice other effects not listed above, contact your doctor or pharmacist. In the [place] - Call your doctor for medical advice about side effects. You may report side effects to FDA at 0-504-LEN-0646. In [place] - Call your doctor for medical advice about side effects. You may report side effects to [facility] [place] at [phone].      PRECAUTIONS:  Before taking warfarin, tell your doctor or pharmacist if you are allergic to it; or if you have any other allergies. This product may contain inactive ingredients, which can cause allergic reactions or other problems. Talk to your pharmacist for more details. Before using this medication, tell your doctor or pharmacist your medical history, especially of:  blood disorders (such as anemia, hemophilia), bleeding problems (such as bleeding of the stomach/intestines, bleeding in the brain), blood vessel disorders (such as aneurysms), recent major injury/surgery, liver disease, alcohol use, mental/mood disorders (including memory problems), frequent falls/injuries. It is important that all your doctors and dentists know that you take warfarin. Before having surgery or any medical/dental procedures, tell your doctor or dentist that you are taking this medication and about all the products you use (including prescription drugs, nonprescription drugs, and herbal products). Avoid getting injections into the muscles. If you must have an injection into a muscle (for example, a flu shot), it should be given in the arm. This way, it will be easier to check for bleeding and/or apply pressure bandages. This medication may cause stomach bleeding. Daily use of alcohol while using this medicine will increase your risk for stomach bleeding and may also affect how this medication works. Limit or avoid alcoholic beverages. If you have not been eating well, if you have an illness or infection that causes fever, vomiting, or diarrhea for more than 2 days, or if you start using any antibiotic medications, contact your doctor or pharmacist immediately because these conditions can affect how warfarin works. This medication can cause heavy bleeding. To lower the chance of getting cut, bruised, or injured, use great caution with sharp objects like safety razors and nail cutters. Use an electric razor when shaving and a soft toothbrush when brushing your teeth. Avoid activities such as contact sports. If you fall or injure yourself, especially if you hit your head, call your doctor immediately. Your doctor may need to check you. The Food & Drug Administration has stated that generic warfarin products are interchangeable. However, consult your doctor or pharmacist before switching warfarin  "products. Be careful not to take more than one medication that contains warfarin unless specifically directed by the doctor or health care provider who is monitoring your warfarin treatment. Older adults may be at greater risk for bleeding while using this drug. This medication is not recommended for use during pregnancy because of serious (possibly fatal) harm to an unborn baby. Discuss the use of reliable forms of birth control with your doctor. If you become pregnant or think you may be pregnant, tell your doctor immediately. If you are planning pregnancy, discuss a plan for managing your condition with your doctor before you become pregnant. Your doctor may switch the type of medication you use during pregnancy. Very small amounts of this medication may pass into breast milk but is unlikely to harm a nursing infant. Consult your doctor before breast-feeding.      DRUG INTERACTIONS:  Drug interactions may change how your medications work or increase your risk for serious side effects. This document does not contain all possible drug interactions. Keep a list of all the products you use (including prescription/nonprescription drugs and herbal products) and share it with your doctor and pharmacist. Do not start, stop, or change the dosage of any medicines without your doctor's approval. Warfarin interacts with many prescription, nonprescription, vitamin, and herbal products. This includes medications that are applied to the skin or inside the vagina or rectum. The interactions with warfarin usually result in an increase or decrease in the \"blood-thinning\" (anticoagulant) effect. Your doctor or other health care professional should closely monitor you to prevent serious bleeding or clotting problems. While taking warfarin, it is very important to tell your doctor or pharmacist of any changes in medications, vitamins, or herbal products that you are taking. Some products that may interact with this drug include: " capecitabine, imatinib, mifepristone. Aspirin, aspirin-like drugs (salicylates), and nonsteroidal anti-inflammatory drugs (NSAIDs such as ibuprofen, naproxen, celecoxib) may have effects similar to warfarin. These drugs may increase the risk of bleeding problems if taken during treatment with warfarin. Carefully check all prescription/nonprescription product labels (including drugs applied to the skin such as pain-relieving creams) since the products may contain NSAIDs or salicylates. Talk to your doctor about using a different medication (such as acetaminophen) to treat pain/fever. Low-dose aspirin and related drugs (such as clopidogrel, ticlopidine) should be continued if prescribed by your doctor for specific medical reasons such as heart attack or stroke prevention. Consult your doctor or pharmacist for more details. Many herbal products interact with warfarin. Tell your doctor before taking any herbal products, especially bromelains, coenzyme Q10, cranberry, danshen, dong quai, fenugreek, garlic, ginkgo biloba, ginseng, and Bimal's wort, among others. This medication may interfere with a certain laboratory test to measure theophylline levels, possibly causing false test results. Make sure laboratory personnel and all your doctors know you use this drug.      OVERDOSE:  If overdose is suspected, contact a poison control center or emergency room immediately. [place] residents can call the [place] National Poison Hotline at 1-274.517.7619. [place] residents can call a provincial poison control center. Symptoms of overdose may include: bloody/black/tarry stools, pink/dark urine, unusual/prolonged bleeding.      NOTES:  Do not share this medication with others. Laboratory and/or medical tests (such as INR, complete blood count) must be performed periodically to monitor your progress or check for side effects. Consult your doctor for more details.      MISSED DOSE:  For the best possible benefit, do not miss any doses. If  you do miss a dose and remember on the same day, take it as soon as you remember. If you remember on the next day, skip the missed dose and resume your usual dosing schedule. Do not double the dose to catch up because this could increase your risk for bleeding. Keep a record of missed doses to give to your doctor or pharmacist. Contact your doctor or pharmacist if you miss 2 or more doses in a row.      STORAGE:  Store at room temperature away from light and moisture. Do not store in the bathroom. Keep all medications away from children and pets. Do not flush medications down the toilet or pour them into a drain unless instructed to do so. Properly discard this product when it is  or no longer needed. Consult your pharmacist or local waste disposal company for more details about how to safely discard your product.      MEDICAL ALERT:  Your condition and medication can cause complications in a medical emergency. For information about enrolling in MedicAlert, call 1-792.295.7518 ([place]) or 1-730.939.5657 ([place]).      Information last revised 2010 Copyright(c) 2010 [employer]                       Depression / Suicide Risk    As you are discharged from this [facility] facility, it is important to learn how to keep safe from harming yourself.    Recognize the warning signs:  · Abrupt changes in personality, positive or negative- including increase in energy   · Giving away possessions  · Change in eating patterns- significant weight changes-  positive or negative  · Change in sleeping patterns- unable to sleep or sleeping all the time   · Unwillingness or inability to communicate  · Depression  · Unusual sadness, discouragement and loneliness  · Talk of wanting to die  · Neglect of personal appearance   · Rebelliousness- reckless behavior  · Withdrawal from people/activities they love  · Confusion- inability to concentrate     If you or a loved one observes any of these behaviors or has  concerns about self-harm, here's what you can do:  · Talk about it- your feelings and reasons for harming yourself  · Remove any means that you might use to hurt yourself (examples: pills, rope, extension cords, firearm)  · Get professional help from the community (Mental Health, Substance Abuse, psychological counseling)  · Do not be alone:Call your Safe Contact- someone whom you trust who will be there for you.  · Call your local CRISIS HOTLINE 794-9594 or 237-791-5497  · Call your local Children's Mobile Crisis Response Team [place] (155) 118-0317 or www.Memobox  · Call the toll free National Suicide Prevention Hotlines   · National Suicide Prevention Lifeline 891-644-TKDS (3726)  · National Hope Line Network 800-SUICIDE (133-9482)

## 2019-05-30 NOTE — PROGRESS NOTES
Chart Check Complete    Monitor Summary:    Rhythm- paced   Rate- 70-73  Ectopy- n/a  Measurements- paced

## 2019-05-30 NOTE — PROGRESS NOTES
Pt d/c'd home with spouse. D/c instructions d/w pt and spouse. Both expressed understanding about new meds and follow up instructions. Transported pt to car via [facility]echair

## 2019-05-30 NOTE — PROGRESS NOTES
Received report from Vu in PPU post op Watchmen procedure. Pt has been AOx4, remains on bedrest. Pt on 2L NC with adequate 02 sats. BP has been running in the 90s, Dr Swenson made aware, ok with BP as long as not symptomatic. Medications have yet to arrive to floor for administration, will pass this off to night RN, pt and family aware that medications are coming. Pt takes Sinemet for his Parkinsons. Right groin site has some shadowing, outlined, no hematoma or pain. No new bleeding. Pulses palpable in all quadrants. Pt has been Paced on tele. Pt aware of restrictions.

## 2019-05-30 NOTE — CARE PLAN
Problem: Safety  Goal: Will remain free from injury    Intervention: Provide assistance with mobility  Pt currently on bedrest until 2100.       Goal: Will remain free from falls  Outcome: PROGRESSING AS EXPECTED  Bed alarm in lowest locked position, pt and wife educated on fall risk. Demonstrated use of call bell.

## 2019-05-30 NOTE — PROGRESS NOTES
Still missing medications from the floor, MARGA Gutierrez made aware, agrees to give them all when they arrive.

## 2019-05-30 NOTE — DISCHARGE SUMMARY
CHIEF COMPLAINT ON ADMISSION  Elective admission for WATCHMAN procedure    CODE STATUS  Full Code    HPI & HOSPITAL COURSE  This is a 73 y.o. year old male here for elective admission for Watchman procedure.  He is followed longitudinally by Dr. Li with [facility] and was referred to Dr. Swenson for consideration of DIDIER closure device secondary to need for anticoagulation and high fall risk secondary to Parkinson's disease.  Patient was seen in office by Dr. Swenson 4/29/19.  Past medical history also significant for systolic heart failure secondary to ischemic cardiomyopathy with stable symptoms, CAD S/P PCI, last >3 years ago.      Procedural Conclusions per Dr. Swenson's Op Note:  Device implanted:  Size  27 mm  Serial #[id number]  Max appendage pre-measurement 23mm  Max device measurement 22 mm (19%) compression  Impressions:    1. Successful DIDIER closure   Recommendations:  1. Warfarin and ASA 81 (target INR 2-2.5)   2. Admit to monitored bedrest.  3. Echo and removal of figure of 8 suture in the AM    The patient has been seen and examined in EP rounds this AM.  His monitored rhythm is paced in the RV with underline AF.  Telemetry history has been reviewed and is without acute abnormality or new arrhythmia.  His EKG, vital signs, lab data, echo have been reviewed and are stable.  The patient denies any chest pain, dyspnea, dizziness, paraesthesias, or other complaints.  His physical exam is without acute abnormality; specifically his right femoral access site is clean and dry.  The figure eight suture was removed.  There is no evidence of hematoma or bleeding.  He has been out of bed and ambulated well.     Echo Conclusions:  CONCLUSIONS  Limited study to evaluate for pericardial effusion.  No pericardial effusion seen.    Therefore, he is discharged in good and stable condition with close outpatient follow-up.    PROCEDURES  WATCHMAN procedure, Dr. Swenson, 5/29/19.  Please see full procedure note for details.      CONSULTATIONS  None    DISCHARGE PROBLEM LIST  1. Atrial Fibrillation  2. S/P WATCHMAN procedure  3. Anticoagulation with coumadin    MEDICATIONS ON DISCHARGE   Roger Jimenez   Home Medication Instructions ARMIDA:[id number]    Printed on:05/30/19 1147   Medication Information                      aspirin 81 MG tablet  Take 81 mg by mouth every day.             atorvastatin (LIPITOR) 40 MG TABS  Take 1 Tab by mouth every day.             carbidopa-levodopa (SINEMET)  MG Tab  Take 1 Tab by mouth 3 times a day.             citalopram (CELEXA) 40 MG Tab  TAKE ONE TABLET BY MOUTH ONE TIME DAILY              coenzyme Q-10 30 MG capsule  Take 60 mg by mouth every day.             metoprolol SR (TOPROL XL) 50 MG TABLET SR 24 HR  Take 50 mg by mouth every bedtime.             warfarin (COUMADIN) 5 MG Tab  Take 1 Tab by mouth COUMADIN-DAILY.             Medications at time of discharge were discussed in detail with the patent and his wife.     DIET  Cardiac, Low sodium    ACTIVITY/POST WATCHMAN INSTRUCTIONS  No lifting > 10 lbs x 1 week.  No baths or hot tubs x 1 week.  May shower on Friday and take off groin dressing.  Continue to monitor site daily for warmth, redness, discolored drainage    Please keep all follow up appointments    Please walk and take deep breaths after discharge.  After discharge, if  experiences chest pain, shortness of breath,neurological changes, high fever, pre syncope/syncope, trouble with catheter site needs to be seen in the emergency dept.         LABORATORY  Lab Results   Component Value Date/Time    SODIUM 140 05/30/2019 03:55 AM    POTASSIUM 4.2 05/30/2019 03:55 AM    CHLORIDE 107 05/30/2019 03:55 AM    CO2 27 05/30/2019 03:55 AM    GLUCOSE 100 (H) 05/30/2019 03:55 AM    BUN 28 (H) 05/30/2019 03:55 AM    CREATININE 1.14 05/30/2019 03:55 AM        Lab Results   Component Value Date/Time    WBC 8.8 05/30/2019 03:55 AM    HEMOGLOBIN 12.9 (L) 05/30/2019 03:55 AM    HEMATOCRIT 40.3 (L)  05/30/2019 03:55 AM    PLATELETCT 162 (L) 05/30/2019 03:55 AM        FOLLOW UP  Future Appointments  Date Time Provider Department Center   5/31/2019 11:00 AM [facility] EXAM 5 [facility] None   6/10/2019 2:00 PM ADRYAN Schwarz [facility] None   8/2/2019 9:00 AM Brian Reyes M.D. [facility]     SPECIFIC OUTPATIENT FOLLOW-UP  Patient will be seen in EP clinic in about one week for procedure follow up.  He will be seen in close follow up with anticoagulation clinic 5/31/19.     The above discharge plan was discussed in detail with the patient and his wife and they verbalize understanding and are in agreement with the discharge plan.     Dione Wylie   5/30/2019 11:46 AM

## 2019-05-30 NOTE — PROGRESS NOTES
Inpatient Anticoagulation Service Note    Date: 5/30/2019    Reason for Anticoagulation: Atrial Fibrillation  Target INR: 2.0 to 2.5 (Per Cardiology )  [id number] VASc Score: 4  HAS-BLED Score: 2   Hemoglobin Value: (!) 12.9  Hematocrit Value: (!) 40.3    INR from last 7 days     Date/Time INR Value    05/30/19 0355 (!)  1.18    05/29/19 1539 (!)  1.22    05/29/19 1208  1.07        Dose from last 7 days     Date/Time Dose (mg)    05/30/19 0839  7.5    05/29/19 1839  5        Average Dose (mg):  (New start this admission)  Significant Interactions: Aspirin, Statin, Other (Comments) (SSRI)  Bridge Therapy: No   Reversal Agent Administered: Not Applicable    Assessment: INR subtherapeutic, trend downward 1.18 from 1.22 following warfarin 5mg last night. Expect ~ 5 days for warfarin to achieve full anticoagulation. INR goal 2 - 2.5 per cardiology.   Potential drug-drug interactions identified with acute inpatient medications: No major DDIs    Potential drug-drug interactions identified with chronic home medications: Aspirin and Atorvastatin which will become established interactions.   Inpatient Diet: Cardiac     Plan:  Warfarin 7.5mg x one now followed by resumption of warfarin 5mg po daily tomorrow. INR monitoring daily to discharge.     Education Material Provided?: No    Pharmacist suggested discharge dosing: Warfarin 5mg po daily      Charisma Sargent, PharmD

## 2019-05-30 NOTE — PROGRESS NOTES
Inpatient Anticoagulation Service Note    Date: 5/29/2019    Reason for Anticoagulation: Atrial Fibrillation (S/P Watchman DIDIER closure)   Target INR: 2.0 to 2.5 (Per cardiology)  [id number] VASc Score: 4  HAS-BLED Score: 2     Hemoglobin Value: 16  Hematocrit Value: 48.7    INR from last 7 days     Date/Time INR Value    05/29/19 1539 (!)  1.22    05/29/19 1208  1.07        Dose from last 7 days     Date/Time Dose (mg)    05/29/19 1839  5        Average Dose (mg):  New start this admission  Significant Interactions: Aspirin, Statin, Celexa  Bridge Therapy: No   Reversal Agent Administered: Not Applicable    Comments: Patient underwent a Watchman DIDIER closure today.  Cardiology has ordered Coumadin therapy to start in addition to home aspirin 81 mg daily, plan to stop Plavix at this time.  Plan for combination therapy for 45 days per outpatient notes from Dr. Swenson.  INR goal of 2-2.5 per cardiology.  INR today essentially baseline.      Plan:  Start Coumadin 5 mg daily.  INR daily x 7 ordered.  Pharmacy to monitor and adjust as needed.           Education Material Provided?: No (Would benefit prior to discharge)  Pharmacist suggested discharge dosing: Coumadin 5 mg daily with INR follow up ~ 48-72 hours post discharge.        Shirlene Gale, PharmD, BCPS

## 2019-05-30 NOTE — PROGRESS NOTES
2RN Skin Check performed with Matt RN:    Silicone 02 tubing within use, behind ears intact, closed, blanching.     All pressure points are intact, buttocks is pink,closed,blanchable.     Pt remains on bedrest until 2100.

## 2019-05-31 ENCOUNTER — ANTICOAGULATION VISIT (OUTPATIENT)
Dept: VASCULAR LAB | Facility: MEDICAL CENTER | Age: 74
End: 2019-05-31
Attending: INTERNAL MEDICINE
Payer: MEDICARE

## 2019-05-31 DIAGNOSIS — I48.91 ATRIAL FIBRILLATION, UNSPECIFIED TYPE (HCC): ICD-10-CM

## 2019-05-31 DIAGNOSIS — Z79.01 CHRONIC ANTICOAGULATION: ICD-10-CM

## 2019-05-31 LAB
INR BLD: 1.3 (ref 0.9–1.2)
INR PPP: 1.3 (ref 2–3.5)

## 2019-05-31 PROCEDURE — 85610 PROTHROMBIN TIME: CPT

## 2019-05-31 PROCEDURE — 99213 OFFICE O/P EST LOW 20 MIN: CPT

## 2019-05-31 ASSESSMENT — CHA2DS2 SCORE
DIABETES: NO
SEX: MALE
CHA2DS2 VASC SCORE: 4
CHF OR LEFT VENTRICULAR DYSFUNCTION: YES
PRIOR STROKE OR TIA OR THROMBOEMBOLISM: NO
VASCULAR DISEASE: YES
AGE 65 TO 74: NO
AGE 75 OR GREATER: YES
HYPERTENSION: NO

## 2019-05-31 NOTE — PROGRESS NOTES
Anticoagulation Summary  As of 2019    INR goal:   2.0-2.5   TTR:   --   INR used for dosin.30! (2019)   Warfarin maintenance plan:   5 mg (5 mg x 1) every day   Weekly warfarin total:   35 mg   Plan last modified:   Liberty Espino (2019)   Next INR check:   6/3/2019   Target end date:   7/15/2019    Indications    Atrial fibrillation (HCC) [I48.91] [I48.91]             Anticoagulation Episode Summary     INR check location:       Preferred lab:       Send INR reminders to:       Comments:   **watchman procedure 19 - on warfarin for ~45 days.**      Anticoagulation Care Providers     Provider Role Specialty Phone number    Bharath Swenson M.D. Referring Cardiology [phone]    [facility] Responsible  [phone]        Anticoagulation Patient Findings  Patient Findings     Negatives:   Signs/symptoms of thrombosis, Signs/symptoms of bleeding, Laboratory test error suspected, Change in health, Change in alcohol use, Change in activity, Upcoming invasive procedure, Emergency department visit, Upcoming dental procedure, Missed doses, Extra doses, Change in medications, Change in diet/appetite, Hospital admission, Bruising, Other complaints        Pt is new to [facility], and new to warfarin.    Patient has PMH of CAD, s/p PCI,  Systolic heart failure secondary to ischemic cardiomyopathy, AF and Parkinson's disease and high fall risk- so recently underwent Watchman procedure and discharged on Warfarin 5mg QD. Referred to our clinic by Dr. Bharath Swenson.    Discussed indication for warfarin therapy and INR goal range. Explained our services, hours of operation, warfarin therapy, potential SE, potential DI. Discussed diet at length, with an emphasis on foods rich in vitamin K.  Discussed monitoring parameters, such as blood in urine, blood in stool, discussed what to do if a dose is missed, or suspected as missed.  Emphasized importance of compliance and consistency  including follow up. Discussed lifestyle choices of ETOH & smoking and its impact on therapy.  Patient contract was signed and scanned into the patients chart.    Chads-Vasc= 4 (CHF, age, CAD)    HAS-BLED= 2 (age, ASA)    Antiplatelet therapy=Yes, patient is on ASA 81mg QD as he has CAD- s/p PCI (3 yrs ago) Patient has d/c Plavix.    Can pt be transitioned to DOAC? Not indicated     HPI:  Patient has had 3 doses of warfarin total thus far. Patient currently taking warfarin 5mg QD.  Patient lives with his wife 1 hour outside of [place] in [place], [place] and requested that if possible, they would like to test at lab near their house instead of having to drive in to [place] each time. Patient given standing order to draw INR at [facility] near their house.   Patient also in discussion with his PCP, Dr. Reyes, who may take over anticoagulation monitoring since he is closer to them. Patient will keep us posted on this.     A/P   INR is SUB-therapeutic today at 1.3  Patient will continue with the current dosing regimen of 5mg QD and will follow up again with a lab draw on Monday, June 3rd. Patient will call the clinic to follow up if they do not hear from us within 24 hrs of INR test. We will call patient when results are received.     Next appt: Monday, Jenni 3, 2019    Reji Espino PharmD

## 2019-06-02 ENCOUNTER — TELEPHONE (OUTPATIENT)
Dept: VASCULAR LAB | Facility: MEDICAL CENTER | Age: 74
End: 2019-06-02

## 2019-06-03 LAB — INR PPP: 2.7 (ref 2–3.5)

## 2019-06-03 NOTE — TELEPHONE ENCOUNTER
Agree with anticoagulation note above.  Will continue with approx 45 days of anticoag with warfarin at direction of cards s/p watchman.    Michael Bloch, MD  Anticoagulation Clinic    Cc:  LAKEISHA Reyes

## 2019-06-04 ENCOUNTER — ANTICOAGULATION MONITORING (OUTPATIENT)
Dept: VASCULAR LAB | Facility: MEDICAL CENTER | Age: 74
End: 2019-06-04

## 2019-06-04 DIAGNOSIS — I48.91 ATRIAL FIBRILLATION, UNSPECIFIED TYPE (HCC): ICD-10-CM

## 2019-06-04 NOTE — PROGRESS NOTES
Anticoagulation Summary  As of 2019    INR goal:   2.0-2.5   TTR:   --   INR used for dosin.70! (6/3/2019)   Warfarin maintenance plan:   5 mg (5 mg x 1) every day   Weekly warfarin total:   35 mg   Plan last modified:   Liberty Espino (2019)   Next INR check:      Target end date:   7/15/2019    Indications    Atrial fibrillation (HCC) [I48.91] [I48.91]             Anticoagulation Episode Summary     INR check location:       Preferred lab:       Send INR reminders to:       Comments:   **watchman procedure 19 - on warfarin for ~45 days.**      Anticoagulation Care Providers     Provider Role Specialty Phone number    Bharath Swenson M.D. Referring Cardiology [phone]    [facility] Responsible  [phone]        Anticoagulation Patient Findings        Spoke to patient on the phone.   INR  supra-therapeutic.   Denies signs/symptoms of bleeding and/or thrombosis.   Denies changes to diet or medications.   Follow up appointment in 2 days     2.5mg today then resume     Matheus Amos, JessicaD

## 2019-06-10 ENCOUNTER — TELEPHONE (OUTPATIENT)
Dept: CARDIOLOGY | Facility: MEDICAL CENTER | Age: 74
End: 2019-06-10

## 2019-06-10 ENCOUNTER — OFFICE VISIT (OUTPATIENT)
Dept: CARDIOLOGY | Facility: MEDICAL CENTER | Age: 74
End: 2019-06-10
Payer: MEDICARE

## 2019-06-10 VITALS
SYSTOLIC BLOOD PRESSURE: 116 MMHG | OXYGEN SATURATION: 96 % | BODY MASS INDEX: 24.99 KG/M2 | DIASTOLIC BLOOD PRESSURE: 74 MMHG | HEIGHT: 75 IN | HEART RATE: 68 BPM | WEIGHT: 201 LBS

## 2019-06-10 DIAGNOSIS — Z79.01 CHRONIC ANTICOAGULATION: ICD-10-CM

## 2019-06-10 DIAGNOSIS — Z95.818 PRESENCE OF WATCHMAN LEFT ATRIAL APPENDAGE CLOSURE DEVICE: ICD-10-CM

## 2019-06-10 DIAGNOSIS — I48.0 PAF (PAROXYSMAL ATRIAL FIBRILLATION) (HCC): ICD-10-CM

## 2019-06-10 LAB — EKG IMPRESSION: NORMAL

## 2019-06-10 PROCEDURE — 93000 ELECTROCARDIOGRAM COMPLETE: CPT | Performed by: INTERNAL MEDICINE

## 2019-06-10 PROCEDURE — 99214 OFFICE O/P EST MOD 30 MIN: CPT | Performed by: NURSE PRACTITIONER

## 2019-06-10 ASSESSMENT — ENCOUNTER SYMPTOMS
SPUTUM PRODUCTION: 0
STRIDOR: 0
WEIGHT LOSS: 0
BLURRED VISION: 0
VOMITING: 0
DIZZINESS: 0
ABDOMINAL PAIN: 0
TREMORS: 0
NAUSEA: 0
SPEECH CHANGE: 0
COUGH: 0
HEARTBURN: 0
SHORTNESS OF BREATH: 0
PND: 0
HEADACHES: 0
SENSORY CHANGE: 0
DIARRHEA: 0
HEMOPTYSIS: 0
PALPITATIONS: 0
ORTHOPNEA: 0
WEAKNESS: 0
CHILLS: 0
FEVER: 0
DOUBLE VISION: 0
WHEEZING: 0
FOCAL WEAKNESS: 0
BLOOD IN STOOL: 0
LOSS OF CONSCIOUSNESS: 0
TINGLING: 0
SORE THROAT: 0

## 2019-06-10 NOTE — PROGRESS NOTES
Cardiology/Electrophysiology Follow-up Note      Subjective:   Chief Complaint:   Chief Complaint   Patient presents with   • Atrial Fibrillation     PP DX:PAF       Roger Jimenez is a 73 y.o. male who presents today for follow up after Watchman procedure by Dr. Swenson 5/29/19.    He is followed by Dr. Li at [facility] and was referred to Dr. Swenson for consideration of WATCHMAN.  Past medical history also significant for systolic heart failure secondary to ischemic cardiomyopathy with stable symptoms, CAD S/P PCI, last >3 years ago, parkinson's disease, mitral regurgitation, SSS with placement of a PPM atrial fibrillation with high risk for long term anticoagulation.        Procedural Conclusions per Dr. Swenson's Op note:  Device implanted:  Size  27 mm  Serial #[id number]  Max appendage pre-measurement 23mm  Max device measurement 22 mm (19%) compression  Impressions:    1. Successful DIDIER closure   Recommendations:  1. Warfarin and ASA 81 (target INR 2-2.5)   2. Admit to monitored bedrest.  3. Echo and removal of figure of 8 suture in the AM    Today in follow up he tells me that procedural wise his post op course has been well.  He did get a gout attack in his left great toe which affected him with inability to walk, and increased fatigue/sleepiness for a few days.  He has started on medrol dose pack and things are starting to return back toward normal for his baseline.  He currently denies chest pain, dizziness, palpitations, pre syncope or syncope, dyspnea, PND, orthopnea, or lower extremity edema.      Patient endorses medication compliance        Past Medical History:   Diagnosis Date   • Anxiety    • Arthritis     osteo, right knee   • Breath shortness     with exertion   • CAD (coronary artery disease) 10/11    s/p FRANCI to diagonal   • Cancer (HCC)     skin   • Cataract     removed bilat   • Chronic cough    • Depression    • ED (erectile dysfunction)    • GOUT     questionable   • Heart burn    • High  cholesterol    • History of atrial fibrillation     s/p ablation   • History of claustrophobia    • HTN (hypertension)     Borderline   • Hyperlipidemia    • Indigestion    • Mitral regurgitation     moderate echo 1/13   • MAGDALENA (obstructive sleep apnea)     doesn't uses CPAP   • Pacemaker    • Parkinson disease (HCC)    • Snoring    • SVT (supraventricular tachycardia) ([facility])     s/p ablation   • Urinary urgency      Past Surgical History:   Procedure Laterality Date   • RECOVERY  11/2/2011    Performed by SURGERY, CATH-RECOVERY at [facility]   • OTHER  2011    heart cath and stent   • CATARACT PHACO WITH IOL Bilateral    • KNEE ARTHROSCOPY  [id number]   • OTHER ORTHOPEDIC SURGERY      3 right knee surgies   • PACEMAKER INSERTION       Family History   Problem Relation Age of Onset   • Lung Disease Mother    • Stroke Father      Social History     Social History   • Marital status:      Spouse name: N/A   • Number of children: N/A   • Years of education: N/A     Occupational History   • Not on file.     Social History Main Topics   • Smoking status: Former Smoker     Packs/day: 1.00     Years: 10.00     Types: Cigarettes     Quit date: 11/2/1973   • Smokeless tobacco: Never Used   • Alcohol use Yes      Comment: 1-2 daily   • Drug use: No   • Sexual activity: Not on file     Other Topics Concern   • Not on file     Social History Narrative   • No narrative on file     No Known Allergies    Current Outpatient Prescriptions   Medication Sig Dispense Refill   • MethylPREDNISolone (MEDROL DOSEPAK) 4 MG Tablet Therapy Pack Take 1 Tab by mouth every day. 1 Kit 0   • warfarin (COUMADIN) 5 MG Tab Take 1 Tab by mouth COUMADIN-DAILY. 30 Tab 0   • coenzyme Q-10 30 MG capsule Take 60 mg by mouth every day.     • citalopram (CELEXA) 40 MG Tab TAKE ONE TABLET BY MOUTH ONE TIME DAILY  90 Tab 0   • aspirin 81 MG tablet Take 81 mg by mouth every day.     • metoprolol SR (TOPROL XL) 50 MG TABLET SR 24 HR Take  50 mg by mouth every bedtime.     • carbidopa-levodopa (SINEMET)  MG Tab Take 1 Tab by mouth 3 times a day. 270 Tab 1   • atorvastatin (LIPITOR) 40 MG TABS Take 1 Tab by mouth every day. 30 Tab 11     No current facility-administered medications for this visit.        Review of Systems   Constitutional: Negative for chills, fever, malaise/fatigue and weight loss.   HENT: Negative for congestion, nosebleeds, sore throat and tinnitus.    Eyes: Negative for blurred vision and double vision.   Respiratory: Negative for cough, hemoptysis, sputum production, shortness of breath, wheezing and stridor.    Cardiovascular: Negative for chest pain, palpitations, orthopnea, leg swelling and PND.   Gastrointestinal: Negative for abdominal pain, blood in stool, diarrhea, heartburn, melena, nausea and vomiting.   Musculoskeletal:        Report gout to L great toe    Skin: Negative for rash.   Neurological: Negative for dizziness, tingling, tremors, sensory change, speech change, focal weakness, loss of consciousness, weakness and headaches.        Parkinson's      All others systems reviewed and negative.     Objective:     There were no vitals taken for this visit. There is no height or weight on file to calculate BMI.    Physical Exam   Constitutional: He is well-developed, well-nourished, and in no distress.   HENT:   Head: Normocephalic and atraumatic.   Eyes: Pupils are equal, round, and reactive to light. Conjunctivae and EOM are normal.   Neck: Normal range of motion. Neck supple. No JVD present. No tracheal deviation present.   Cardiovascular: Normal rate, normal heart sounds and intact distal pulses.  An irregular rhythm present. Exam reveals no gallop and no friction rub.    No murmur heard.  Pulses:       Radial pulses are 2+ on the right side, and 2+ on the left side.        Dorsalis pedis pulses are 2+ on the right side, and 2+ on the left side.        Posterior tibial pulses are 2+ on the right side, and 2+ on  the left side.   Right femoral access site is clean and dry.  No erythema or evidence of hematoma.    Pulmonary/Chest: Effort normal and breath sounds normal. No respiratory distress. He has no wheezes. He has no rales. He exhibits no tenderness.   Abdominal: Soft. Bowel sounds are normal.   Musculoskeletal: Normal range of motion. He exhibits no edema.   Neurological: No cranial nerve deficit.   Skin: Skin is warm and dry.   Psychiatric: Mood, affect and judgment normal.         Cardiac Imaging and Procedures Review:    EKG (6/10/19) reviewed by myself:   Afib/V paced.     Echo (ALBARO) (5/29/19):   Left Ventricle  The left ventricle was normal in size and function.    Right Ventricle  The right ventricle was normal in size and function.    Right Atrium  The right atrium is normal in size.    Left Atrium  The left atrium is normal in size.    LA Appendage  Normal left atrial appendage.    IA Septum  The interatrial septum is normal.    IV Septum  The interventricular septum is normal.    Mitral Valve  Structurally normal mitral valve without significant stenosis or   regurgitation.    Aortic Valve  Structurally normal aortic valve without significant stenosis or   regurgitation.    Tricuspid Valve  Structurally normal tricuspid valve without significant stenosis or   regurgitation.    Pulmonic Valve  Structurally normal pulmonic valve without significant stenosis or   regurgitation.    Pericardium  Normal pericardium without effusion.    Aorta  The aortic root is normal.    Labs (personally reviewed and notable for):   Lab Results   Component Value Date/Time    SODIUM 140 05/30/2019 03:55 AM    POTASSIUM 4.2 05/30/2019 03:55 AM    CHLORIDE 107 05/30/2019 03:55 AM    CO2 27 05/30/2019 03:55 AM    GLUCOSE 100 (H) 05/30/2019 03:55 AM    BUN 28 (H) 05/30/2019 03:55 AM    CREATININE 1.14 05/30/2019 03:55 AM      Lab Results   Component Value Date/Time    WBC 8.8 05/30/2019 03:55 AM    RBC 4.15 (L) 05/30/2019 03:55 AM     HEMOGLOBIN 12.9 (L) 05/30/2019 03:55 AM    HEMATOCRIT 40.3 (L) 05/30/2019 03:55 AM    MCV 97.1 05/30/2019 03:55 AM    MCH 31.1 05/30/2019 03:55 AM    MCHC 32.0 (L) 05/30/2019 03:55 AM    MPV 9.3 05/30/2019 03:55 AM    NEUTSPOLYS 69.20 05/29/2019 12:08 PM    LYMPHOCYTES 17.40 (L) 05/29/2019 12:08 PM    MONOCYTES 10.50 05/29/2019 12:08 PM    EOSINOPHILS 1.60 05/29/2019 12:08 PM    BASOPHILS 0.80 05/29/2019 12:08 PM      PT/INR:   Lab Results   Component Value Date/Time    PROTHROMBTM 15.1 (H) 05/30/2019 03:55 AM    INR 2.70 06/03/2019       Assessment:     1. PAF (paroxysmal atrial fibrillation) ([facility])  EKG    EC-ALBARO W/O CONT   2. Presence of Watchman left atrial appendage closure device  EC-ALBARO W/O CONT   3. Chronic anticoagulation         Medical Decision Making:  Today's Assessment / Status / Plan:   1. S/P Watchman (5/29/19):  - Clinically doing well post procedure.  His right femoral access site is clean and dry, there is no erythema or evidence of hematoma.   - Will have him scheduled for his 45 day post procedure ALBARO.  I have reviewed medication changes that will occur post ALBARO if minimal leak (<5mm) around watchman (Would stop coumadin at that time and resume Plavix).  Will continue ASA and coumadin for now.     2. Anticoagulation:  - Anticoagulated for atrial fibrillation.  High risk for long term anticoagulation secondary to parkinson disease.  S/P Watchman as above.   - Followed by Dr. Reyes and Coumadin Clinic per chart review.  Continue follow up as designated intervals.    Plan reviewed in detail with the patient and he verbalizes understanding and is in agreement.   RTC post ALBARO for review and as scheduled with his cardiologist, Dr. Li, sooner if clinical condition changes  Collaborating MD/ADD: Calixto.     ADRYAN Schwarz

## 2019-06-10 NOTE — LETTER
[facility]   [address]  [place] [address]  Phone: [phone]  Fax: [phone]              Roger Jimenez  1945    Encounter Date: 6/10/2019    ADRYAN Schwarz          PROGRESS NOTE:  Cardiology/Electrophysiology Follow-up Note      Subjective:   Chief Complaint:   Chief Complaint   Patient presents with   • Atrial Fibrillation     PP DX:PAF       Roger Jimenez is a 73 y.o. male who presents today for follow up after Watchman procedure by Dr. Swenson 5/29/19.    He is followed by Dr. Li at [facility] and was referred to Dr. Swenson for consideration of WATCHMAN.  Past medical history also significant for systolic heart failure secondary to ischemic cardiomyopathy with stable symptoms, CAD S/P PCI, last >3 years ago, parkinson's disease, mitral regurgitation, SSS with placement of a PPM atrial fibrillation with high risk for long term anticoagulation.        Procedural Conclusions per Dr. Swenson's Op note:  Device implanted:  Size  27 mm  Serial #[id number]  Max appendage pre-measurement 23mm  Max device measurement 22 mm (19%) compression  Impressions:    1. Successful DIDIER closure   Recommendations:  1. Warfarin and ASA 81 (target INR 2-2.5)   2. Admit to monitored bedrest.  3. Echo and removal of figure of 8 suture in the AM    Today in follow up he tells me that procedural wise his post op course has been well.  He did get a gout attack in his left great toe which affected him with inability to walk, and increased fatigue/sleepiness for a few days.  He has started on medrol dose pack and things are starting to return back toward normal for his baseline.  He currently denies chest pain, dizziness, palpitations, pre syncope or syncope, dyspnea, PND, orthopnea, or lower extremity edema.      Patient endorses medication compliance        Past Medical History:   Diagnosis Date   • Anxiety    • Arthritis     osteo, right knee   • Breath  shortness     with exertion   • CAD (coronary artery disease) 10/11    s/p FRANCI to diagonal   • Cancer (HCC)     skin   • Cataract     removed bilat   • Chronic cough    • Depression    • ED (erectile dysfunction)    • GOUT     questionable   • Heart burn    • High cholesterol    • History of atrial fibrillation     s/p ablation   • History of claustrophobia    • HTN (hypertension)     Borderline   • Hyperlipidemia    • Indigestion    • Mitral regurgitation     moderate echo 1/13   • MAGDALENA (obstructive sleep apnea)     doesn't uses CPAP   • Pacemaker    • Parkinson disease (HCC)    • Snoring    • SVT (supraventricular tachycardia) ([facility])     s/p ablation   • Urinary urgency      Past Surgical History:   Procedure Laterality Date   • RECOVERY  11/2/2011    Performed by SURGERY, CATH-RECOVERY at SURGERY SAME DAY [facility] ORS   • OTHER  2011    heart cath and stent   • CATARACT PHACO WITH IOL Bilateral    • KNEE ARTHROSCOPY  [id number]   • OTHER ORTHOPEDIC SURGERY      3 right knee surgies   • PACEMAKER INSERTION       Family History   Problem Relation Age of Onset   • Lung Disease Mother    • Stroke Father      Social History     Social History   • Marital status:      Spouse name: N/A   • Number of children: N/A   • Years of education: N/A     Occupational History   • Not on file.     Social History Main Topics   • Smoking status: Former Smoker     Packs/day: 1.00     Years: 10.00     Types: Cigarettes     Quit date: 11/2/1973   • Smokeless tobacco: Never Used   • Alcohol use Yes      Comment: 1-2 daily   • Drug use: No   • Sexual activity: Not on file     Other Topics Concern   • Not on file     Social History Narrative   • No narrative on file     No Known Allergies    Current Outpatient Prescriptions   Medication Sig Dispense Refill   • MethylPREDNISolone (MEDROL DOSEPAK) 4 MG Tablet Therapy Pack Take 1 Tab by mouth every day. 1 Kit 0   • warfarin (COUMADIN) 5 MG Tab Take 1 Tab by mouth COUMADIN-DAILY. 30 Tab 0     • coenzyme Q-10 30 MG capsule Take 60 mg by mouth every day.     • citalopram (CELEXA) 40 MG Tab TAKE ONE TABLET BY MOUTH ONE TIME DAILY  90 Tab 0   • aspirin 81 MG tablet Take 81 mg by mouth every day.     • metoprolol SR (TOPROL XL) 50 MG TABLET SR 24 HR Take 50 mg by mouth every bedtime.     • carbidopa-levodopa (SINEMET)  MG Tab Take 1 Tab by mouth 3 times a day. 270 Tab 1   • atorvastatin (LIPITOR) 40 MG TABS Take 1 Tab by mouth every day. 30 Tab 11     No current facility-administered medications for this visit.        Review of Systems   Constitutional: Negative for chills, fever, malaise/fatigue and weight loss.   HENT: Negative for congestion, nosebleeds, sore throat and tinnitus.    Eyes: Negative for blurred vision and double vision.   Respiratory: Negative for cough, hemoptysis, sputum production, shortness of breath, wheezing and stridor.    Cardiovascular: Negative for chest pain, palpitations, orthopnea, leg swelling and PND.   Gastrointestinal: Negative for abdominal pain, blood in stool, diarrhea, heartburn, melena, nausea and vomiting.   Musculoskeletal:        Report gout to L great toe    Skin: Negative for rash.   Neurological: Negative for dizziness, tingling, tremors, sensory change, speech change, focal weakness, loss of consciousness, weakness and headaches.        Parkinson's      All others systems reviewed and negative.     Objective:     There were no vitals taken for this visit. There is no height or weight on file to calculate BMI.    Physical Exam   Constitutional: He is well-developed, well-nourished, and in no distress.   HENT:   Head: Normocephalic and atraumatic.   Eyes: Pupils are equal, round, and reactive to light. Conjunctivae and EOM are normal.   Neck: Normal range of motion. Neck supple. No JVD present. No tracheal deviation present.   Cardiovascular: Normal rate, normal heart sounds and intact distal pulses.  An irregular rhythm present. Exam reveals no gallop and  no friction rub.    No murmur heard.  Pulses:       Radial pulses are 2+ on the right side, and 2+ on the left side.        Dorsalis pedis pulses are 2+ on the right side, and 2+ on the left side.        Posterior tibial pulses are 2+ on the right side, and 2+ on the left side.   Right femoral access site is clean and dry.  No erythema or evidence of hematoma.    Pulmonary/Chest: Effort normal and breath sounds normal. No respiratory distress. He has no wheezes. He has no rales. He exhibits no tenderness.   Abdominal: Soft. Bowel sounds are normal.   Musculoskeletal: Normal range of motion. He exhibits no edema.   Neurological: No cranial nerve deficit.   Skin: Skin is warm and dry.   Psychiatric: Mood, affect and judgment normal.         Cardiac Imaging and Procedures Review:    EKG (6/10/19) reviewed by myself:   Afib/V paced.     Echo (ALBARO) (5/29/19):   Left Ventricle  The left ventricle was normal in size and function.    Right Ventricle  The right ventricle was normal in size and function.    Right Atrium  The right atrium is normal in size.    Left Atrium  The left atrium is normal in size.    LA Appendage  Normal left atrial appendage.    IA Septum  The interatrial septum is normal.    IV Septum  The interventricular septum is normal.    Mitral Valve  Structurally normal mitral valve without significant stenosis or   regurgitation.    Aortic Valve  Structurally normal aortic valve without significant stenosis or   regurgitation.    Tricuspid Valve  Structurally normal tricuspid valve without significant stenosis or   regurgitation.    Pulmonic Valve  Structurally normal pulmonic valve without significant stenosis or   regurgitation.    Pericardium  Normal pericardium without effusion.    Aorta  The aortic root is normal.    Labs (personally reviewed and notable for):   Lab Results   Component Value Date/Time    SODIUM 140 05/30/2019 03:55 AM    POTASSIUM 4.2 05/30/2019 03:55 AM    CHLORIDE 107 05/30/2019  03:55 AM    CO2 27 05/30/2019 03:55 AM    GLUCOSE 100 (H) 05/30/2019 03:55 AM    BUN 28 (H) 05/30/2019 03:55 AM    CREATININE 1.14 05/30/2019 03:55 AM      Lab Results   Component Value Date/Time    WBC 8.8 05/30/2019 03:55 AM    RBC 4.15 (L) 05/30/2019 03:55 AM    HEMOGLOBIN 12.9 (L) 05/30/2019 03:55 AM    HEMATOCRIT 40.3 (L) 05/30/2019 03:55 AM    MCV 97.1 05/30/2019 03:55 AM    MCH 31.1 05/30/2019 03:55 AM    MCHC 32.0 (L) 05/30/2019 03:55 AM    MPV 9.3 05/30/2019 03:55 AM    NEUTSPOLYS 69.20 05/29/2019 12:08 PM    LYMPHOCYTES 17.40 (L) 05/29/2019 12:08 PM    MONOCYTES 10.50 05/29/2019 12:08 PM    EOSINOPHILS 1.60 05/29/2019 12:08 PM    BASOPHILS 0.80 05/29/2019 12:08 PM      PT/INR:   Lab Results   Component Value Date/Time    PROTHROMBTM 15.1 (H) 05/30/2019 03:55 AM    INR 2.70 06/03/2019       Assessment:     1. PAF (paroxysmal atrial fibrillation) (HCC)  EKG    EC-ALBARO W/O CONT   2. Presence of Watchman left atrial appendage closure device  EC-ALBARO W/O CONT   3. Chronic anticoagulation         Medical Decision Making:  Today's Assessment / Status / Plan:   1. S/P Watchman (5/29/19):  - Clinically doing well post procedure.  His right femoral access site is clean and dry, there is no erythema or evidence of hematoma.   - Will have him scheduled for his 45 day post procedure ALBARO.  I have reviewed medication changes that will occur post ALBARO if minimal leak (<5mm) around watchman (Would stop coumadin at that time and resume Plavix).  Will continue ASA and coumadin for now.     2. Anticoagulation:  - Anticoagulated for atrial fibrillation.  High risk for long term anticoagulation secondary to parkinson disease.  S/P Watchman as above.   - Followed by Dr. Reyes and [facility] per jah review.  Continue follow up as designated time frames.     Plan reviewed in detail with the patient and he verbalizes understanding and is in agreement.   RTC post ALBARO for review and as scheduled with his cardiologist,   Jen, sooner if clinical condition changes  Collaborating MD/ADD: Calixto.     ADRYAN Schwarz M.D.  [address] [address]  VIA In Basket

## 2019-06-10 NOTE — TELEPHONE ENCOUNTER
JOSE Roach's (Protection procedure ) voicemail to call this patient and schedule the follow up ALBARO during the week of 7-15-19. Requested a call back with date of procedure. Patient is aware that Marley will be contacting this patient to schedule follow up procedure.

## 2019-07-17 ENCOUNTER — TELEPHONE (OUTPATIENT)
Dept: CARDIOLOGY | Facility: MEDICAL CENTER | Age: 74
End: 2019-07-17

## 2019-07-17 NOTE — TELEPHONE ENCOUNTER
Snow Muir, R.N.   Phone Number: [phone]             SS/Ella     Pt's wife, Lien wants to know if  is to continue taking coumadin post ALBARO. If so he would be needing a refill. Please call Lien to advise at [phone].      Call out to Marley at [facility] to send report for review.   Spoke with Lien with this update. She verbalized understanding. Awaiting report and will call her back.

## 2019-07-18 NOTE — TELEPHONE ENCOUNTER
Showed EA ALBARO report she verbalized okay to stop coumadin for 2 days then start plavix at 75mg daily.     Also scheduled for 6 month post ALBARO watchman per EA.     Lien verbalized understanding for all. Stated they already have the plavix. ALBARO sent to scanning.

## 2020-01-13 ENCOUNTER — OFFICE VISIT (OUTPATIENT)
Dept: CARDIOLOGY | Facility: MEDICAL CENTER | Age: 75
End: 2020-01-13
Payer: MEDICARE

## 2020-01-13 VITALS
BODY MASS INDEX: 25.12 KG/M2 | HEART RATE: 70 BPM | SYSTOLIC BLOOD PRESSURE: 116 MMHG | DIASTOLIC BLOOD PRESSURE: 78 MMHG | HEIGHT: 75 IN | WEIGHT: 202 LBS | OXYGEN SATURATION: 96 %

## 2020-01-13 DIAGNOSIS — Z95.0 CARDIAC PACEMAKER IN SITU: ICD-10-CM

## 2020-01-13 DIAGNOSIS — I25.10 CORONARY ARTERY DISEASE INVOLVING NATIVE CORONARY ARTERY OF NATIVE HEART WITHOUT ANGINA PECTORIS: ICD-10-CM

## 2020-01-13 DIAGNOSIS — I48.91 ATRIAL FIBRILLATION, UNSPECIFIED TYPE (HCC): ICD-10-CM

## 2020-01-13 PROCEDURE — 93279 PRGRMG DEV EVAL PM/LDLS PM: CPT | Performed by: INTERNAL MEDICINE

## 2020-01-13 PROCEDURE — 99214 OFFICE O/P EST MOD 30 MIN: CPT | Mod: 25 | Performed by: INTERNAL MEDICINE

## 2020-01-13 NOTE — PROGRESS NOTES
Arrhythmia Clinic Note (Established patient)    DOS: 1/13/2020    Chief complaint/Reason for consult: F/u WATCHMAN    Interval History:  Patient is a 75 yo M. History of prior CAD s/p FRANCI. PAF/AFL and SSS s/p PPM. Has history of Parkinson's and frequent falls. Referred to me initially for WATCHMAN and underwent procedure in 5/2020. Did well afterwards. Just got his PM generator changed without complication. Still on DAPT. No complaints today.    ROS (+ highlighted in red):  General--Negative for fatigue, weight loss or weight gain  Cardiovascular--Negative for CP, orthopnea, PND    Past Medical History:   Diagnosis Date   • Anxiety    • Arthritis     osteo, right knee   • Breath shortness     with exertion   • CAD (coronary artery disease) 10/11    s/p FRANCI to diagonal   • Cancer (HCC)     skin   • Cataract     removed bilat   • Chronic cough    • Depression    • ED (erectile dysfunction)    • GOUT     questionable   • Heart burn    • High cholesterol    • History of atrial fibrillation     s/p ablation   • History of claustrophobia    • HTN (hypertension)     Borderline   • Hyperlipidemia    • Indigestion    • Mitral regurgitation     moderate echo 1/13   • MAGDALENA (obstructive sleep apnea)     doesn't uses CPAP   • Pacemaker    • Parkinson disease (HCC)    • Presence of Watchman left atrial appendage closure device    • Snoring    • SVT (supraventricular tachycardia) ([facility])     s/p ablation   • Urinary urgency        Past Surgical History:   Procedure Laterality Date   • RECOVERY  11/2/2011    Performed by SURGERY, CATH-RECOVERY at SURGERY SAME DAY [facility] ORS   • OTHER  2011    heart cath and stent   • CATARACT PHACO WITH IOL Bilateral    • KNEE ARTHROSCOPY  [id number]   • OTHER ORTHOPEDIC SURGERY      3 right knee surgies   • PACEMAKER INSERTION         Social History     Socioeconomic History   • Marital status:      Spouse name: Not on file   • Number of children: Not on file   • Years of education: Not on  file   • Highest education level: Not on file   Occupational History   • Not on file   Social Needs   • Financial resource strain: Not on file   • Food insecurity:     Worry: Not on file     Inability: Not on file   • Transportation needs:     Medical: Not on file     Non-medical: Not on file   Tobacco Use   • Smoking status: Former Smoker     Packs/day: 1.00     Years: 10.00     Pack years: 10.00     Types: Cigarettes     Last attempt to quit: 1973     Years since quittin.2   • Smokeless tobacco: Never Used   Substance and Sexual Activity   • Alcohol use: Yes     Comment: 1-2 daily   • Drug use: No   • Sexual activity: Not on file   Lifestyle   • Physical activity:     Days per week: Not on file     Minutes per session: Not on file   • Stress: Not on file   Relationships   • Social connections:     Talks on phone: Not on file     Gets together: Not on file     Attends [employer] service: Not on file     Active member of club or organization: Not on file     Attends meetings of clubs or organizations: Not on file     Relationship status: Not on file   • Intimate partner violence:     Fear of current or ex partner: Not on file     Emotionally abused: Not on file     Physically abused: Not on file     Forced sexual activity: Not on file   Other Topics Concern   • Not on file   Social History Narrative   • Not on file       Family History   Problem Relation Age of Onset   • Lung Disease Mother    • Stroke Father        No Known Allergies    Current Outpatient Medications   Medication Sig Dispense Refill   • meloxicam (MOBIC) 15 MG tablet Take 15 mg by mouth.  3   • clopidogrel (PLAVIX) 75 MG Tab Take 75 mg by mouth every day.     • sildenafil citrate (VIAGRA) 100 MG tablet TAKE 1 TAB BY MOUTH AS NEEDED FOR ERECTILE DYSFUNCTION. 3 Tab 11   • citalopram (CELEXA) 40 MG Tab TAKE ONE TABLET BY MOUTH ONE TIME DAILY  90 Tab 0   • coenzyme Q-10 30 MG capsule Take 60 mg by mouth every day.     • aspirin 81 MG tablet  "Take 81 mg by mouth every day.     • metoprolol SR (TOPROL XL) 50 MG TABLET SR 24 HR Take 50 mg by mouth every bedtime.     • carbidopa-levodopa (SINEMET)  MG Tab Take 1 Tab by mouth 3 times a day. 270 Tab 1   • atorvastatin (LIPITOR) 40 MG TABS Take 1 Tab by mouth every day. 30 Tab 11   • citalopram (CELEXA) 40 MG Tab TAKE ONE TABLET BY MOUTH ONE TIME DAILY  (Patient not taking: Reported on 1/13/2020) 90 Tab 1     No current facility-administered medications for this visit.        Physical Exam:  Vitals:    01/13/20 1032   BP: 116/78   BP Location: Left arm   Patient Position: Sitting   BP Cuff Size: Adult   Pulse: 70   SpO2: 96%   Weight: 91.6 kg (202 lb)   Height: 1.905 m (6' 3\")     General appearance: NAD, conversant  HEENT: PERRL, neck is supple with FROM  Lungs: Clear to auscultation, normal respiratory effort  CV: RRR, no murmurs/rubs/gallops, no JVD, pacemaker site wound check looks good  Abdomen: Soft, non-tender with normal bowel sounds  Extremities: No peripheral edema, no clubbing or cyanosis  Skin: No rash, lesions, or ulcers  Psych: Alert and oriented to person, place and time    Data:  Labs reviewed    ALBARO from [facility] reviewed, showing no leak around WATCHMAN device    Device check reviewed, working appropriately    Impression/Plan:  1. Cardiac pacemaker in situ     2. Coronary artery disease involving native coronary artery of native heart without angina pectoris     3. Atrial fibrillation, unspecified type (HCC)       -Device checks out appropriately but he is in AFL  -I will defer to his [facility] cardiologist regarding the atrial arrhythmia  -From my standpoint he is okay to just go down to ASA daily, but his cardiologist may prefer DAPT lifelong for his coronary disease  -Will defer to them regarding this  -Continue ASA/lipitor for stable coronary disease  -F/u PRN with me    Bharath Swenson MD  "

## 2020-04-10 ENCOUNTER — ANTICOAGULATION MONITORING (OUTPATIENT)
Dept: VASCULAR LAB | Facility: MEDICAL CENTER | Age: 75
End: 2020-04-10

## 2020-04-10 DIAGNOSIS — I48.91 ATRIAL FIBRILLATION, UNSPECIFIED TYPE (HCC): ICD-10-CM

## 2020-04-10 NOTE — PROGRESS NOTES
Discharged from [facility].  Pt is no longer anticoagulated  Katy Braga, Clinical Pharmacist, CDE, CACP

## 2021-06-25 PROBLEM — R09.02 HYPOXEMIA: Status: ACTIVE | Noted: 2021-06-25

## 2021-06-25 PROBLEM — I10 ESSENTIAL HYPERTENSION: Status: ACTIVE | Noted: 2021-06-25

## 2021-06-25 PROBLEM — G47.33 OBSTRUCTIVE SLEEP APNEA SYNDROME: Status: ACTIVE | Noted: 2021-06-25

## 2022-03-08 PROBLEM — I73.9 PVD (PERIPHERAL VASCULAR DISEASE) (HCC): Status: ACTIVE | Noted: 2022-01-21

## 2022-03-08 PROBLEM — I48.0 PAROXYSMAL ATRIAL FIBRILLATION (HCC): Status: ACTIVE | Noted: 2019-05-31

## 2022-03-08 PROBLEM — G47.30 SLEEP APNEA: Status: ACTIVE | Noted: 2021-06-25

## 2022-03-08 PROBLEM — Z95.0 CARDIAC PACEMAKER: Status: ACTIVE | Noted: 2021-11-22

## 2022-03-08 PROBLEM — I49.5 SICK SINUS SYNDROME (HCC): Status: ACTIVE | Noted: 2022-01-21

## 2022-03-08 PROBLEM — F32.A DEPRESSION: Status: ACTIVE | Noted: 2021-11-23

## 2022-03-08 PROBLEM — I42.9 CARDIOMYOPATHY (HCC): Status: ACTIVE | Noted: 2021-11-22

## 2022-04-20 ENCOUNTER — OFFICE VISIT (OUTPATIENT)
Dept: NEUROLOGY | Facility: MEDICAL CENTER | Age: 77
End: 2022-04-20
Attending: PSYCHIATRY & NEUROLOGY
Payer: MEDICARE

## 2022-04-20 VITALS
HEART RATE: 74 BPM | SYSTOLIC BLOOD PRESSURE: 116 MMHG | BODY MASS INDEX: 25.63 KG/M2 | DIASTOLIC BLOOD PRESSURE: 70 MMHG | HEIGHT: 75 IN | RESPIRATION RATE: 12 BRPM | OXYGEN SATURATION: 94 % | TEMPERATURE: 97.9 F | WEIGHT: 206.13 LBS

## 2022-04-20 DIAGNOSIS — F02.818 LEWY BODY DEMENTIA WITH BEHAVIORAL DISTURBANCE (HCC): ICD-10-CM

## 2022-04-20 DIAGNOSIS — G31.83 LEWY BODY DEMENTIA WITH BEHAVIORAL DISTURBANCE (HCC): ICD-10-CM

## 2022-04-20 PROCEDURE — 99212 OFFICE O/P EST SF 10 MIN: CPT | Performed by: PSYCHIATRY & NEUROLOGY

## 2022-04-20 PROCEDURE — 99204 OFFICE O/P NEW MOD 45 MIN: CPT | Performed by: PSYCHIATRY & NEUROLOGY

## 2022-04-20 RX ORDER — RIVASTIGMINE TARTRATE 1.5 MG/1
CAPSULE ORAL
Qty: 180 CAPSULE | Refills: 0 | Status: SHIPPED | OUTPATIENT
Start: 2022-04-20 | End: 2022-06-19

## 2022-04-20 ASSESSMENT — FIBROSIS 4 INDEX: FIB4 SCORE: 4.36

## 2022-04-20 NOTE — PROGRESS NOTES
Chief Complaint   Patient presents with   • New Patient     Parkinson disease       History of present illness:  Roger Jimenez 76 y.o. male with Parkinson's disease complicated by visual hallucinations.  He was placed on pimavanserin, but this was not helpful after 8 weeks and was switched to quetiapine.  Recently, he was seen by Dr. Terrell Arredondo, who is retiring.     Parkinson's medications:  Carb/levo 25/100 1.5 tabs 3 times daily. He has been on l-dopa since 2017.    Quetiapine 25mg qAM and 75mg qAM     He does not notice much of a change with a higher dose of carb/levo.     Movement-Specific ROS  Sleep: He sleeps well at night with quetiapine 75mg.     Swallowing: No problems   Constipation: Is a significant problem. He uses a laxative.   Urination: +Urinary frequency.   Dizziness: He frequently gets dizzy.   Hallucinations: He had a spell where he lost touch with reality for weeks. In the past, he was seeing bugs everywhere, which improved after he started taking quetiapine.    Cognition: Unable to manage his own medications. Does not drive. He showers himself. He does not prepare meals.   Balance: Balance problems are his main issue. He uses a cane. He is not confident when he walks.   Exercise: Water walking. He goes to an adult day care.     Past medical history:   Past Medical History:   Diagnosis Date   • Anxiety    • Arthritis     osteo, right knee   • Breath shortness     with exertion   • CAD (coronary artery disease) 10/11    s/p FRANCI to diagonal   • Cancer (HCC)     skin   • Cataract     removed bilat   • Chronic cough    • Depression    • ED (erectile dysfunction)    • GOUT     questionable   • Heart burn    • High cholesterol    • History of atrial fibrillation     s/p ablation   • History of claustrophobia    • HTN (hypertension)     Borderline   • Hyperlipidemia    • Indigestion    • Mitral regurgitation     moderate echo 1/13   • MAGDALENA (obstructive sleep apnea)     doesn't uses CPAP   •  Pacemaker    • Pacemaker    • Parkinson disease (HCC)    • Presence of Watchman left atrial appendage closure device    • Snoring    • SVT (supraventricular tachycardia) ([facility])     s/p ablation   • Urinary urgency        Past surgical history:   Past Surgical History:   Procedure Laterality Date   • RECOVERY  2011    Performed by SURGERY, CATH-RECOVERY at SURGERY SAME DAY [facility] ORS   • OTHER  2011    heart cath and stent   • CATARACT PHACO WITH IOL Bilateral    • KNEE ARTHROSCOPY  [id number]   • OTHER ORTHOPEDIC SURGERY      3 right knee surgies   • PACEMAKER INSERTION         Family history:   Family History   Problem Relation Age of Onset   • Lung Disease Mother    • Stroke Father    • Cancer Sister        Social history:   Social History     Socioeconomic History   • Marital status:      Spouse name: Not on file   • Number of children: 4   • Years of education: Not on file   • Highest education level: Not on file   Occupational History   • Not on file   Tobacco Use   • Smoking status: Former Smoker     Packs/day: 1.00     Years: 10.00     Pack years: 10.00     Types: Cigarettes     Quit date: 1973     Years since quittin.4   • Smokeless tobacco: Never Used   Vaping Use   • Vaping Use: Never used   Substance and Sexual Activity   • Alcohol use: Yes     Comment: 1-2 daily   • Drug use: No   • Sexual activity: Not on file   Other Topics Concern   • Not on file   Social History Narrative   • Not on file     Social Determinants of Health     Financial Resource Strain: Not on file   Food Insecurity: Not on file   Transportation Needs: Not on file   Physical Activity: Not on file   Stress: Not on file   Social Connections: Not on file   Intimate Partner Violence: Not on file   Housing Stability: Not on file       Current medications:   Current Outpatient Medications   Medication   • rivastigmine (EXELON) 1.5 MG Cap   • citalopram (CELEXA) 40 MG Tab   • QUEtiapine (SEROQUEL) 25 MG Tab   • meloxicam  "(MOBIC) 15 MG tablet   • clopidogrel (PLAVIX) 75 MG Tab   • coenzyme Q-10 30 MG capsule   • aspirin 81 MG tablet   • metoprolol SR (TOPROL XL) 50 MG TABLET SR 24 HR   • carbidopa-levodopa (SINEMET)  MG Tab   • atorvastatin (LIPITOR) 40 MG TABS   • losartan (COZAAR) 25 MG Tab   • sildenafil citrate (VIAGRA) 100 MG tablet     No current facility-administered medications for this visit.       Medication Allergy:  No Known Allergies    Physical examination:   Vitals:    04/20/22 1435 04/20/22 1438   BP: 134/78 116/70   BP Location: Left arm Left arm   Patient Position: Sitting Standing   BP Cuff Size: Adult Adult   Pulse: 72 74   Resp: 12    Temp: 36.6 °C (97.9 °F)    TempSrc: Temporal    SpO2: 94% 94%   Weight: 93.5 kg (206 lb 2.1 oz)    Height: 1.905 m (6' 3\")      Neurological Exam  Mental Status  Awake and alert. Moderate dysarthria present. Language is fluent with no aphasia.    Cranial Nerves  CN III, IV, VI: Abnormal extraocular movements: Diminished upgaze. No nystagmus. Normal saccades. Normal smooth pursuit.    Motor   Increased muscle tone. No abnormal involuntary movements.  Bilaterally moderately diminished amplitude and speed of movement.    Gait    Stooped posture with shuffling gait.  There is no freezing. .      Labs:  I reviewed the following labs personally:  None     Imaging:   None     ASSESSMENT AND PLAN:  Problem List Items Addressed This Visit    None     Visit Diagnoses     Lewy body dementia with behavioral disturbance (HCC)        Relevant Medications    rivastigmine (EXELON) 1.5 MG Cap          1. Lewy body dementia with behavioral disturbance (HCC)  - rivastigmine (EXELON) 1.5 MG Cap; Take 1 Capsule by mouth 2 times a day for 30 days, THEN 2 Capsules 2 times a day for 30 days.  Dispense: 180 Capsule; Refill: 0    This is a 76-year-old male with parkinsonism and cognitive impairment/hallucinations since 2015.  The hallucinations and cognitive disorder were early presenting symptoms.  " Carbidopa/levodopa has not been markedly helpful for him at the dosage of 150 mg 3 times daily.  Today, I did not increase his carbidopa/levodopa given that there is a history of severe visual hallucinations and psychosis and his bradykinesia is not severe. He has no tremors.  I have prescribed rivastigmine for treatment of his cognitive impairment and visual hallucinations.  He has been provided instructions to start at 1.5 mg twice daily and to increase to 3 mg twice daily after 1 month.    FOLLOW-UP:   Return in about 5 months (around 9/20/2022) for virtual follow-up .    Total time spent for the day for this patient unrelated to procedure time is: 41 minutes. I spent 32 minutes in face to face time and I spent 3 minutes pre-charting and 6 minutes in post-visit documentation.      Dr. Paco Reyna D.O.  [facility]

## 2022-05-19 DIAGNOSIS — F02.818 LEWY BODY DEMENTIA WITH BEHAVIORAL DISTURBANCE (HCC): ICD-10-CM

## 2022-05-19 DIAGNOSIS — G31.83 LEWY BODY DEMENTIA WITH BEHAVIORAL DISTURBANCE (HCC): ICD-10-CM

## 2022-05-19 RX ORDER — QUETIAPINE FUMARATE 25 MG/1
75 TABLET, FILM COATED ORAL
Qty: 270 TABLET | Refills: 2 | Status: SHIPPED | OUTPATIENT
Start: 2022-05-19 | End: 2023-01-26 | Stop reason: SDUPTHER

## 2022-09-07 PROBLEM — E78.5 HYPERLIPIDEMIA: Status: ACTIVE | Noted: 2022-05-04

## 2022-09-07 PROBLEM — R41.3 MEMORY DIFFICULTIES: Status: ACTIVE | Noted: 2022-05-04

## 2022-10-06 PROBLEM — M86.9 OSTEOMYELITIS OF SECOND TOE OF RIGHT FOOT (HCC): Status: ACTIVE | Noted: 2022-10-06

## 2022-11-01 ENCOUNTER — TELEPHONE (OUTPATIENT)
Dept: NEUROLOGY | Facility: MEDICAL CENTER | Age: 77
End: 2022-11-01
Payer: MEDICARE

## 2022-11-01 NOTE — TELEPHONE ENCOUNTER
Established Patient     [facility] Patient is checked in Patient Demographics? Yes    Is visit type and length correct?  Yes    Is referral attached to visit? Yes    Were records received from referring provider? Yes    Patient was not contacted to have someone accompany them to visit.    Is this appointment scheduled as a Hospital Follow-Up?  No    Does the patient require any pre procedure or post procedure follow up? No    If any orders were placed at last visit or intended to be done for this visit do we have Results/Consult Notes? No  Labs - Labs were not ordered at last office visit.  Imaging - Imaging was not ordered at last office visit.  Referrals - Referral ordered, patient has NOT been seen.        10.  If patient appointment is for Botox - is order pended for provider? No, not needed.

## 2022-11-04 ENCOUNTER — OFFICE VISIT (OUTPATIENT)
Dept: NEUROLOGY | Facility: MEDICAL CENTER | Age: 77
End: 2022-11-04
Attending: PSYCHIATRY & NEUROLOGY
Payer: MEDICARE

## 2022-11-04 VITALS
HEART RATE: 68 BPM | WEIGHT: 197.31 LBS | BODY MASS INDEX: 24.53 KG/M2 | OXYGEN SATURATION: 93 % | RESPIRATION RATE: 14 BRPM | TEMPERATURE: 97.9 F | SYSTOLIC BLOOD PRESSURE: 118 MMHG | HEIGHT: 75 IN | DIASTOLIC BLOOD PRESSURE: 60 MMHG

## 2022-11-04 DIAGNOSIS — G31.83 MODERATE LEWY BODY DEMENTIA WITH PSYCHOTIC DISTURBANCE (HCC): ICD-10-CM

## 2022-11-04 DIAGNOSIS — F02.B2 MODERATE LEWY BODY DEMENTIA WITH PSYCHOTIC DISTURBANCE (HCC): ICD-10-CM

## 2022-11-04 PROCEDURE — 99212 OFFICE O/P EST SF 10 MIN: CPT | Performed by: PSYCHIATRY & NEUROLOGY

## 2022-11-04 PROCEDURE — 99214 OFFICE O/P EST MOD 30 MIN: CPT | Performed by: PSYCHIATRY & NEUROLOGY

## 2022-11-04 ASSESSMENT — FIBROSIS 4 INDEX: FIB4 SCORE: 4.42

## 2022-11-04 NOTE — PROGRESS NOTES
Chief Complaint   Patient presents with    Follow-Up     Lewy body dementia with behavioral disturbance       History of present illness:  Roger Jimenez 77 y.o. male with Lewy body dementia complicated by visual hallucinations since 2015. The hallucinations and cognitive disorder were early presenting symptoms.  Carbidopa/levodopa has not been markedly helpful for him at the dosage of 150 mg 3 times daily.  He was placed on pimavanserin, but this was not helpful after 8 weeks and was switched to quetiapine.  Recently, he was seen by Dr. Terrell Arredondo, who is retiring.      Parkinson's medications:  Carb/levo 25/100 1.5 tabs 3 times daily. He has been on l-dopa since 2017.    Quetiapine 25mg qAM and 50mg qAM   Citalopram 40mg daily      He does not notice much of a change with a higher dose of carb/levo.      Movement-Specific ROS  Sleep: He sleeps well at night with quetiapine 50mg.     Swallowing: No problems   Constipation: Is a significant problem. He uses a laxative.   Urination: +Urinary frequency.   Dizziness: He frequently gets dizzy.   Hallucinations: He had a spell where he lost touch with reality for weeks. In the past, he was seeing bugs everywhere, which improved after he started taking quetiapine. He sees things in the bed, for example, a snake. He ignores the visions usually.   Cognition: Unable to manage his own medications. Does not drive. He showers himself. He does not prepare meals.   Balance: Balance problems are his main issue. He uses a cane. He is not confident when he walks.   Exercise: Water walking. He goes to an adult day care.      11/4/22: Rivastigmine was prescribed at a 1.5mg twice daily but caused thrashing/kicking mainly at night. After stopping rivastigmine, this stopped. He is part of an adult day club at the community [place] for cognitively impaired people. He needs assistance with dressing, meal prep. No falls recently.     Past medical history:   Past Medical History:    Diagnosis Date    Anxiety     Arthritis     osteo, right knee    Breath shortness     with exertion    CAD (coronary artery disease) 10/11    s/p FRANCI to diagonal    Cancer (HCC)     skin    Cataract     removed bilat    Chronic cough     Depression     ED (erectile dysfunction)     GOUT     questionable    Heart burn     High cholesterol     History of atrial fibrillation     s/p ablation    History of claustrophobia     HTN (hypertension)     Borderline    Hyperlipidemia     Indigestion     Mitral regurgitation     moderate echo     MAGDALENA (obstructive sleep apnea)     doesn't uses CPAP    Pacemaker     Pacemaker     Parkinson disease (HCC)     Presence of Watchman left atrial appendage closure device     Snoring     SVT (supraventricular tachycardia) (HCC)     s/p ablation    Urinary urgency        Past surgical history:   Past Surgical History:   Procedure Laterality Date    PB AMPUTATION METATARSAL+TOE,SINGLE Right 10/12/2022    Procedure: RIGHT 2ND RAY AMPUTATION;  Surgeon: Luis Fernando Lloyd M.D.;  Location: [facility];  Service: Orthopedics    RECOVERY  2011    Performed by SURGERY, CATH-RECOVERY at [facility]    OTHER      heart cath and stent    CATARACT PHACO WITH IOL Bilateral     KNEE ARTHROSCOPY  [id number]    OTHER ORTHOPEDIC SURGERY      3 right knee surgies    PACEMAKER INSERTION         Family history:   Family History   Problem Relation Age of Onset    Lung Disease Mother     Stroke Father     Cancer Sister        Social history:   Social History     Socioeconomic History    Marital status:      Spouse name: Not on file    Number of children: 4    Years of education: Not on file    Highest education level: Not on file   Occupational History    Not on file   Tobacco Use    Smoking status: Former     Packs/day: 1.00     Years: 10.00     Pack years: 10.00     Types: Cigarettes     Quit date: 1973     Years since quittin.0     "Smokeless tobacco: Never   Vaping Use    Vaping Use: Never used   Substance and Sexual Activity    Alcohol use: Yes     Comment: 1-2 daily    Drug use: No    Sexual activity: Not on file   Other Topics Concern    Not on file   Social History Narrative    Not on file     Social Determinants of Health     Financial Resource Strain: Not on file   Food Insecurity: Not on file   Transportation Needs: Not on file   Physical Activity: Not on file   Stress: Not on file   Social Connections: Not on file   Intimate Partner Violence: Not on file   Housing Stability: Not on file       Current medications:   Current Outpatient Medications   Medication    carbidopa-levodopa (SINEMET)  MG Tab    acetaminophen (TYLENOL) 500 MG Tab    hydrOXYzine pamoate (VISTARIL) 25 MG Cap    ibuprofen (MOTRIN) 600 MG Tab    citalopram (CELEXA) 40 MG Tab    metoprolol SR (TOPROL XL) 50 MG TABLET SR 24 HR    QUEtiapine (SEROQUEL) 25 MG Tab    meloxicam (MOBIC) 15 MG tablet    clopidogrel (PLAVIX) 75 MG Tab    coenzyme Q-10 30 MG capsule    aspirin 81 MG tablet    metoprolol SR (TOPROL XL) 50 MG TABLET SR 24 HR    atorvastatin (LIPITOR) 40 MG TABS    gabapentin (NEURONTIN) 100 MG Cap    losartan (COZAAR) 25 MG Tab    sildenafil citrate (VIAGRA) 100 MG tablet     No current facility-administered medications for this visit.       Medication Allergy:  No Known Allergies    Physical examination:   Vitals:    11/04/22 1332   BP: 118/60   BP Location: Left arm   Patient Position: Sitting   BP Cuff Size: Adult   Pulse: 68   Resp: 14   Temp: 36.6 °C (97.9 °F)   TempSrc: Skin   SpO2: 93%   Weight: 89.5 kg (197 lb 5 oz)   Height: 1.905 m (6' 3\")     Neurological Exam  Mental Status  Awake and alert. Speech is normal. Language is fluent with no aphasia.  He responds to questions appropriately. He is not tangential. .    Motor   Normal muscle tone. No abnormal involuntary movements. No tremors .      Coordination  Right: Finger-to-nose normal.Left: " Finger-to-nose normal.    Gait  Casual gait: Reduced stride length. Shuffling gait.  Stooped posture .     Labs:  I reviewed the following labs personally:  None    Imaging:   None     ASSESSMENT AND PLAN:  Problem List Items Addressed This Visit       Moderate Lewy body dementia with psychotic disturbance (HCC)    Relevant Medications    carbidopa-levodopa (SINEMET)  MG Tab       1. Moderate Lewy body dementia with psychotic disturbance (HCC)  - carbidopa-levodopa (SINEMET)  MG Tab; Take 1 Tablet by mouth 3 times a day.  Dispense: 270 Tablet; Refill: 2    He was unable to tolerate the rivastigmine due to dream enactment/thrashing. His hallucinations are mild and are fairly well controlled on the quetiapine but continue to cause him to see animals in his bed. Given that he has not had a marked response to carbidopa/levodopa, I have lowered his L-dopa dose to  1 tab 3 times daily.     FOLLOW-UP:   Return if symptoms worsen or fail to improve.    Total time spent for the day for this patient unrelated to procedure time is: 24 minutes. I spent 20 minutes in face to face time and I spent 4 minutes pre-charting and 0 minutes in post-visit documentation.      Dr. Paco Reyna D.O.  [facility]

## 2023-01-03 ENCOUNTER — TELEPHONE (OUTPATIENT)
Dept: NEUROLOGY | Facility: MEDICAL CENTER | Age: 78
End: 2023-01-03
Payer: MEDICARE

## 2023-01-03 NOTE — TELEPHONE ENCOUNTER
Patient wife Lien called state that patient is seeing a lot more bugs and mice that are biting his toes. Patient is not doing well and having more confusions and hallucinations. Is it okay to have 3 Seroquel instead of taking 2? Please advise. Thank you.GISSEL Archibald.

## 2023-01-26 ENCOUNTER — TELEPHONE (OUTPATIENT)
Dept: NEUROLOGY | Facility: MEDICAL CENTER | Age: 78
End: 2023-01-26
Payer: MEDICARE

## 2023-01-26 DIAGNOSIS — G31.83 LEWY BODY DEMENTIA WITH BEHAVIORAL DISTURBANCE (HCC): ICD-10-CM

## 2023-01-26 DIAGNOSIS — F02.818 LEWY BODY DEMENTIA WITH BEHAVIORAL DISTURBANCE (HCC): ICD-10-CM

## 2023-01-26 RX ORDER — QUETIAPINE FUMARATE 25 MG/1
TABLET, FILM COATED ORAL
Qty: 360 TABLET | Refills: 2 | Status: SHIPPED | OUTPATIENT
Start: 2023-01-26

## 2023-01-26 NOTE — TELEPHONE ENCOUNTER
Patient wife called asking for a new prescription for Seroquel. Current dose is 100 mg/day sig 25 mg qam and 75 mg qpm. Patient is almost running out due to dosage change. GISSEL Archibald.

## 2023-02-13 ENCOUNTER — TELEPHONE (OUTPATIENT)
Dept: NEUROLOGY | Facility: MEDICAL CENTER | Age: 78
End: 2023-02-13
Payer: MEDICARE

## 2023-02-13 DIAGNOSIS — F02.818 LEWY BODY DEMENTIA WITH BEHAVIORAL DISTURBANCE (HCC): ICD-10-CM

## 2023-02-13 DIAGNOSIS — G31.83 LEWY BODY DEMENTIA WITH BEHAVIORAL DISTURBANCE (HCC): ICD-10-CM

## 2023-02-13 NOTE — TELEPHONE ENCOUNTER
1. Caller Name: Lien Jimenez                            2. Call Back Number: [phone]        3. How would the patient prefer to be contacted with a response: Phone call OK to leave a detailed message    Patient wife called state that patient is still having hallucinations even with the increase dose of seroquel. Patient is currently taking 1 tablet by mouth every morning and 3 tablets at bedtime. Wife is very concern and wanted a phone call from Dr. Reyna. Phone number is [phone]. Thank you.GISSEL Archibald.

## 2023-02-14 NOTE — TELEPHONE ENCOUNTER
Patient is having fluctuation in level of alertness.  There are periods of the day that occur frequently where he is unable to care for himself and needs to be fed by his wife.  I did recommend that he resume the rivastigmine, given that this was not likely responsible for the worsening of his psychosis reported after starting it.   I have placed a hospice referral after discussing with the wife that this is a terminal condition.    Dr. Paco Reyna D.O.  [facility]

## 2023-08-28 NOTE — ADDENDUM NOTE
----- Message from Miley Whyte MA sent at 8/28/2023  1:10 PM EDT -----  Regarding: FW: Lisonpril  Contact: [phone]    ----- Message -----  From: Janusz Pfeiffer  Sent: 8/28/2023  12:57 PM EDT  To: Garfield Carrasco [facility]  Subject: Lisonpril                                        Due to a health insurance switch, which mandated a pharmacy switch, I recently had my prescription for Lisonpril 20mg, BID switched to [facility]. My new insurance company, [employer], advised they wouldn't not fill it because it is supposed to be a once a day medication. They directed me to get a new prescription for 40mg, once a day. (leaving aside the madness of an insurance company practicing medicine), I need a new prescription for 40mg once a say. I have no problem cutting it in half to keep the dosing as it was, to keep from dropping my pressure too low. Up to you!  New pharmacy is [facility], [address] [place] [address], [id number], [phone]     Addended by: CURT FLORES on: 4/29/2019 03:38 PM     Modules accepted: Orders